# Patient Record
Sex: MALE | Race: WHITE | NOT HISPANIC OR LATINO
[De-identification: names, ages, dates, MRNs, and addresses within clinical notes are randomized per-mention and may not be internally consistent; named-entity substitution may affect disease eponyms.]

---

## 2017-05-08 ENCOUNTER — APPOINTMENT (OUTPATIENT)
Dept: SURGERY | Facility: CLINIC | Age: 47
End: 2017-05-08

## 2017-05-08 VITALS
SYSTOLIC BLOOD PRESSURE: 144 MMHG | BODY MASS INDEX: 37.04 KG/M2 | HEIGHT: 67 IN | HEART RATE: 75 BPM | DIASTOLIC BLOOD PRESSURE: 90 MMHG | WEIGHT: 236 LBS

## 2017-05-08 PROBLEM — Z00.00 ENCOUNTER FOR PREVENTIVE HEALTH EXAMINATION: Status: ACTIVE | Noted: 2017-05-08

## 2017-08-28 ENCOUNTER — OUTPATIENT (OUTPATIENT)
Dept: OUTPATIENT SERVICES | Facility: HOSPITAL | Age: 47
LOS: 1 days | End: 2017-08-28
Payer: COMMERCIAL

## 2017-08-28 VITALS
DIASTOLIC BLOOD PRESSURE: 80 MMHG | OXYGEN SATURATION: 99 % | HEIGHT: 67 IN | TEMPERATURE: 98 F | RESPIRATION RATE: 18 BRPM | HEART RATE: 76 BPM | WEIGHT: 240.08 LBS | SYSTOLIC BLOOD PRESSURE: 142 MMHG

## 2017-08-28 DIAGNOSIS — Z98.890 OTHER SPECIFIED POSTPROCEDURAL STATES: Chronic | ICD-10-CM

## 2017-08-28 DIAGNOSIS — K42.9 UMBILICAL HERNIA WITHOUT OBSTRUCTION OR GANGRENE: ICD-10-CM

## 2017-08-28 DIAGNOSIS — E55.9 VITAMIN D DEFICIENCY, UNSPECIFIED: ICD-10-CM

## 2017-08-28 DIAGNOSIS — Z01.818 ENCOUNTER FOR OTHER PREPROCEDURAL EXAMINATION: ICD-10-CM

## 2017-08-28 LAB
ANION GAP SERPL CALC-SCNC: 7 MMOL/L — SIGNIFICANT CHANGE UP (ref 5–17)
BUN SERPL-MCNC: 22 MG/DL — HIGH (ref 7–18)
CALCIUM SERPL-MCNC: 8.9 MG/DL — SIGNIFICANT CHANGE UP (ref 8.4–10.5)
CHLORIDE SERPL-SCNC: 104 MMOL/L — SIGNIFICANT CHANGE UP (ref 96–108)
CO2 SERPL-SCNC: 28 MMOL/L — SIGNIFICANT CHANGE UP (ref 22–31)
CREAT SERPL-MCNC: 1.06 MG/DL — SIGNIFICANT CHANGE UP (ref 0.5–1.3)
GLUCOSE SERPL-MCNC: 92 MG/DL — SIGNIFICANT CHANGE UP (ref 70–99)
HCT VFR BLD CALC: 45.6 % — SIGNIFICANT CHANGE UP (ref 39–50)
HGB BLD-MCNC: 14.6 G/DL — SIGNIFICANT CHANGE UP (ref 13–17)
MCHC RBC-ENTMCNC: 27.8 PG — SIGNIFICANT CHANGE UP (ref 27–34)
MCHC RBC-ENTMCNC: 32 GM/DL — SIGNIFICANT CHANGE UP (ref 32–36)
MCV RBC AUTO: 86.9 FL — SIGNIFICANT CHANGE UP (ref 80–100)
PLATELET # BLD AUTO: 314 K/UL — SIGNIFICANT CHANGE UP (ref 150–400)
POTASSIUM SERPL-MCNC: 3.8 MMOL/L — SIGNIFICANT CHANGE UP (ref 3.5–5.3)
POTASSIUM SERPL-SCNC: 3.8 MMOL/L — SIGNIFICANT CHANGE UP (ref 3.5–5.3)
RBC # BLD: 5.25 M/UL — SIGNIFICANT CHANGE UP (ref 4.2–5.8)
RBC # FLD: 14 % — SIGNIFICANT CHANGE UP (ref 10.3–14.5)
SODIUM SERPL-SCNC: 139 MMOL/L — SIGNIFICANT CHANGE UP (ref 135–145)
WBC # BLD: 6.6 K/UL — SIGNIFICANT CHANGE UP (ref 3.8–10.5)
WBC # FLD AUTO: 6.6 K/UL — SIGNIFICANT CHANGE UP (ref 3.8–10.5)

## 2017-08-28 PROCEDURE — G0463: CPT

## 2017-08-28 PROCEDURE — 80048 BASIC METABOLIC PNL TOTAL CA: CPT

## 2017-08-28 PROCEDURE — 93005 ELECTROCARDIOGRAM TRACING: CPT

## 2017-08-28 PROCEDURE — 85027 COMPLETE CBC AUTOMATED: CPT

## 2017-08-28 NOTE — H&P PST ADULT - NEGATIVE GASTROINTESTINAL SYMPTOMS
no change in bowel habits/no flatulence/no abdominal pain/no nausea/no constipation/no vomiting/no diarrhea

## 2017-08-28 NOTE — H&P PST ADULT - PMH
Hyperlipidemia    Vitamin D deficiency Hyperlipidemia    Obesity (BMI 30-39.9)    Vitamin D deficiency

## 2017-08-28 NOTE — H&P PST ADULT - NSANTHOSAYNRD_GEN_A_CORE
No. ELIZABETH screening performed.  STOP BANG Legend: 0-2 = LOW Risk; 3-4 = INTERMEDIATE Risk; 5-8 = HIGH Risk

## 2017-08-28 NOTE — H&P PST ADULT - FAMILY HISTORY
Mother  Still living? Yes, Estimated age: Age Unknown  Family history of pulmonary fibrosis, Age at diagnosis: Age Unknown

## 2017-08-28 NOTE — H&P PST ADULT - NEGATIVE CARDIOVASCULAR SYMPTOMS
no paroxysmal nocturnal dyspnea/no palpitations/no orthopnea/no claudication/no chest pain/no dyspnea on exertion/no peripheral edema

## 2017-09-08 ENCOUNTER — TRANSCRIPTION ENCOUNTER (OUTPATIENT)
Age: 47
End: 2017-09-08

## 2017-09-08 ENCOUNTER — OUTPATIENT (OUTPATIENT)
Dept: OUTPATIENT SERVICES | Facility: HOSPITAL | Age: 47
LOS: 1 days | Discharge: HOME | End: 2017-09-08
Payer: COMMERCIAL

## 2017-09-08 VITALS
RESPIRATION RATE: 18 BRPM | DIASTOLIC BLOOD PRESSURE: 85 MMHG | HEIGHT: 67 IN | TEMPERATURE: 98 F | SYSTOLIC BLOOD PRESSURE: 142 MMHG | WEIGHT: 240.08 LBS | HEART RATE: 76 BPM | OXYGEN SATURATION: 100 %

## 2017-09-08 VITALS
RESPIRATION RATE: 18 BRPM | HEART RATE: 57 BPM | OXYGEN SATURATION: 100 % | TEMPERATURE: 98 F | SYSTOLIC BLOOD PRESSURE: 143 MMHG | DIASTOLIC BLOOD PRESSURE: 61 MMHG

## 2017-09-08 DIAGNOSIS — K42.9 UMBILICAL HERNIA WITHOUT OBSTRUCTION OR GANGRENE: ICD-10-CM

## 2017-09-08 DIAGNOSIS — Z98.890 OTHER SPECIFIED POSTPROCEDURAL STATES: Chronic | ICD-10-CM

## 2017-09-08 PROCEDURE — 49585: CPT

## 2017-09-08 PROCEDURE — 49585: CPT | Mod: AS

## 2017-09-08 RX ORDER — OXYCODONE AND ACETAMINOPHEN 5; 325 MG/1; MG/1
1 TABLET ORAL EVERY 4 HOURS
Qty: 0 | Refills: 0 | Status: DISCONTINUED | OUTPATIENT
Start: 2017-09-08 | End: 2017-09-08

## 2017-09-08 RX ORDER — SODIUM CHLORIDE 9 MG/ML
3 INJECTION INTRAMUSCULAR; INTRAVENOUS; SUBCUTANEOUS EVERY 8 HOURS
Qty: 0 | Refills: 0 | Status: DISCONTINUED | OUTPATIENT
Start: 2017-09-08 | End: 2017-09-08

## 2017-09-08 RX ORDER — SODIUM CHLORIDE 9 MG/ML
1000 INJECTION, SOLUTION INTRAVENOUS
Qty: 0 | Refills: 0 | Status: DISCONTINUED | OUTPATIENT
Start: 2017-09-08 | End: 2017-09-16

## 2017-09-08 RX ADMIN — SODIUM CHLORIDE 3 MILLILITER(S): 9 INJECTION INTRAMUSCULAR; INTRAVENOUS; SUBCUTANEOUS at 13:31

## 2017-09-08 NOTE — ASU DISCHARGE PLAN (ADULT/PEDIATRIC). - MEDICATION SUMMARY - MEDICATIONS TO TAKE
I will START or STAY ON the medications listed below when I get home from the hospital:    vitamin d 50,000 IU  1 tab orally once a week  on saturday  --     -- Indication: For as previously perscribed    Percocet 5/325 325 mg-5 mg oral tablet  -- 1 tab(s) by mouth every 6 hours MDD:4  -- Caution federal law prohibits the transfer of this drug to any person other  than the person for whom it was prescribed.  May cause drowsiness.  Alcohol may intensify this effect.  Use care when operating dangerous machinery.  This prescription cannot be refilled.  This product contains acetaminophen.  Do not use  with any other product containing acetaminophen to prevent possible liver damage.  Using more of this medication than prescribed may cause serious breathing problems.    -- Indication: For pain    atorvastatin 40 mg oral tablet  -- 1 tab(s) by mouth once a day (at bedtime)  -- Indication: For as previously perscribed

## 2017-09-14 DIAGNOSIS — E55.9 VITAMIN D DEFICIENCY, UNSPECIFIED: ICD-10-CM

## 2017-09-14 DIAGNOSIS — E66.9 OBESITY, UNSPECIFIED: ICD-10-CM

## 2017-09-14 DIAGNOSIS — K42.9 UMBILICAL HERNIA WITHOUT OBSTRUCTION OR GANGRENE: ICD-10-CM

## 2017-09-14 DIAGNOSIS — E78.5 HYPERLIPIDEMIA, UNSPECIFIED: ICD-10-CM

## 2017-09-26 PROBLEM — Z86.39 HISTORY OF HIGH CHOLESTEROL: Status: RESOLVED | Noted: 2017-09-26 | Resolved: 2017-09-26

## 2017-09-26 PROBLEM — Z87.19 HISTORY OF UMBILICAL HERNIA: Status: RESOLVED | Noted: 2017-09-26 | Resolved: 2017-09-26

## 2017-09-26 PROBLEM — Z86.39 HISTORY OF VITAMIN D DEFICIENCY: Status: RESOLVED | Noted: 2017-09-26 | Resolved: 2017-09-26

## 2017-09-26 PROBLEM — Z78.9 SOCIAL ALCOHOL USE: Status: ACTIVE | Noted: 2017-05-08

## 2017-09-26 PROBLEM — F15.90 CAFFEINE USE: Status: ACTIVE | Noted: 2017-05-08

## 2017-09-26 PROBLEM — Z83.6 FAMILY HISTORY OF PULMONARY FIBROSIS: Status: ACTIVE | Noted: 2017-09-26

## 2017-09-26 PROBLEM — Z87.898 HISTORY OF MORBID OBESITY: Status: RESOLVED | Noted: 2017-09-26 | Resolved: 2017-09-26

## 2017-09-28 ENCOUNTER — APPOINTMENT (OUTPATIENT)
Dept: SURGERY | Facility: CLINIC | Age: 47
End: 2017-09-28

## 2017-09-28 VITALS
HEART RATE: 87 BPM | SYSTOLIC BLOOD PRESSURE: 120 MMHG | TEMPERATURE: 98.9 F | OXYGEN SATURATION: 95 % | BODY MASS INDEX: 38.57 KG/M2 | HEIGHT: 66 IN | WEIGHT: 240 LBS | DIASTOLIC BLOOD PRESSURE: 70 MMHG

## 2017-09-28 DIAGNOSIS — F15.90 OTHER STIMULANT USE, UNSPECIFIED, UNCOMPLICATED: ICD-10-CM

## 2017-09-28 DIAGNOSIS — Z87.898 PERSONAL HISTORY OF OTHER SPECIFIED CONDITIONS: ICD-10-CM

## 2017-09-28 DIAGNOSIS — Z86.39 PERSONAL HISTORY OF OTHER ENDOCRINE, NUTRITIONAL AND METABOLIC DISEASE: ICD-10-CM

## 2017-09-28 DIAGNOSIS — Z78.9 OTHER SPECIFIED HEALTH STATUS: ICD-10-CM

## 2017-09-28 DIAGNOSIS — Z87.19 PERSONAL HISTORY OF OTHER DISEASES OF THE DIGESTIVE SYSTEM: ICD-10-CM

## 2017-09-28 DIAGNOSIS — Z83.6 FAMILY HISTORY OF OTHER DISEASES OF THE RESPIRATORY SYSTEM: ICD-10-CM

## 2018-07-11 ENCOUNTER — INPATIENT (INPATIENT)
Facility: HOSPITAL | Age: 48
LOS: 1 days | Discharge: HOME | DRG: 247 | End: 2018-07-13
Attending: INTERNAL MEDICINE | Admitting: INTERNAL MEDICINE
Payer: COMMERCIAL

## 2018-07-11 ENCOUNTER — INPATIENT (INPATIENT)
Facility: HOSPITAL | Age: 48
LOS: 0 days | Discharge: HOME | DRG: 282 | End: 2018-07-11
Attending: INTERNAL MEDICINE | Admitting: INTERNAL MEDICINE
Payer: COMMERCIAL

## 2018-07-11 VITALS
DIASTOLIC BLOOD PRESSURE: 101 MMHG | HEIGHT: 67 IN | RESPIRATION RATE: 16 BRPM | SYSTOLIC BLOOD PRESSURE: 130 MMHG | WEIGHT: 225.09 LBS | OXYGEN SATURATION: 98 %

## 2018-07-11 VITALS
OXYGEN SATURATION: 98 % | RESPIRATION RATE: 18 BRPM | HEART RATE: 70 BPM | TEMPERATURE: 98 F | DIASTOLIC BLOOD PRESSURE: 87 MMHG | SYSTOLIC BLOOD PRESSURE: 132 MMHG

## 2018-07-11 VITALS
SYSTOLIC BLOOD PRESSURE: 141 MMHG | HEART RATE: 64 BPM | TEMPERATURE: 98 F | RESPIRATION RATE: 18 BRPM | DIASTOLIC BLOOD PRESSURE: 90 MMHG | WEIGHT: 225.09 LBS | HEIGHT: 67 IN | OXYGEN SATURATION: 98 %

## 2018-07-11 DIAGNOSIS — Z98.890 OTHER SPECIFIED POSTPROCEDURAL STATES: Chronic | ICD-10-CM

## 2018-07-11 DIAGNOSIS — E66.9 OBESITY, UNSPECIFIED: ICD-10-CM

## 2018-07-11 DIAGNOSIS — Z29.9 ENCOUNTER FOR PROPHYLACTIC MEASURES, UNSPECIFIED: ICD-10-CM

## 2018-07-11 DIAGNOSIS — I21.4 NON-ST ELEVATION (NSTEMI) MYOCARDIAL INFARCTION: ICD-10-CM

## 2018-07-11 DIAGNOSIS — E78.5 HYPERLIPIDEMIA, UNSPECIFIED: ICD-10-CM

## 2018-07-11 DIAGNOSIS — E55.9 VITAMIN D DEFICIENCY, UNSPECIFIED: ICD-10-CM

## 2018-07-11 LAB
ALBUMIN SERPL ELPH-MCNC: 4 G/DL — SIGNIFICANT CHANGE UP (ref 3.5–5)
ALP SERPL-CCNC: 87 U/L — SIGNIFICANT CHANGE UP (ref 40–120)
ALT FLD-CCNC: 45 U/L DA — SIGNIFICANT CHANGE UP (ref 10–60)
ANION GAP SERPL CALC-SCNC: 8 MMOL/L — SIGNIFICANT CHANGE UP (ref 5–17)
APTT BLD: 30.4 SEC — SIGNIFICANT CHANGE UP (ref 27.5–37.4)
AST SERPL-CCNC: 64 U/L — HIGH (ref 10–40)
BASOPHILS # BLD AUTO: 0.1 K/UL — SIGNIFICANT CHANGE UP (ref 0–0.2)
BASOPHILS NFR BLD AUTO: 1.1 % — SIGNIFICANT CHANGE UP (ref 0–2)
BILIRUB SERPL-MCNC: 1.1 MG/DL — SIGNIFICANT CHANGE UP (ref 0.2–1.2)
BUN SERPL-MCNC: 14 MG/DL — SIGNIFICANT CHANGE UP (ref 7–18)
CALCIUM SERPL-MCNC: 9.1 MG/DL — SIGNIFICANT CHANGE UP (ref 8.4–10.5)
CHLORIDE SERPL-SCNC: 104 MMOL/L — SIGNIFICANT CHANGE UP (ref 96–108)
CK MB BLD-MCNC: 7.5 % — HIGH (ref 0–3.5)
CK MB CFR SERPL CALC: 71.2 NG/ML — HIGH (ref 0–3.6)
CK SERPL-CCNC: 946 U/L — HIGH (ref 35–232)
CO2 SERPL-SCNC: 25 MMOL/L — SIGNIFICANT CHANGE UP (ref 22–31)
CREAT SERPL-MCNC: 0.97 MG/DL — SIGNIFICANT CHANGE UP (ref 0.5–1.3)
EOSINOPHIL # BLD AUTO: 0.2 K/UL — SIGNIFICANT CHANGE UP (ref 0–0.5)
EOSINOPHIL NFR BLD AUTO: 3.7 % — SIGNIFICANT CHANGE UP (ref 0–6)
GLUCOSE SERPL-MCNC: 107 MG/DL — HIGH (ref 70–99)
HCT VFR BLD CALC: 43.6 % — SIGNIFICANT CHANGE UP (ref 39–50)
HGB BLD-MCNC: 14 G/DL — SIGNIFICANT CHANGE UP (ref 13–17)
INR BLD: 1.02 RATIO — SIGNIFICANT CHANGE UP (ref 0.88–1.16)
LYMPHOCYTES # BLD AUTO: 1.3 K/UL — SIGNIFICANT CHANGE UP (ref 1–3.3)
LYMPHOCYTES # BLD AUTO: 24.8 % — SIGNIFICANT CHANGE UP (ref 13–44)
MCHC RBC-ENTMCNC: 28.2 PG — SIGNIFICANT CHANGE UP (ref 27–34)
MCHC RBC-ENTMCNC: 32.2 GM/DL — SIGNIFICANT CHANGE UP (ref 32–36)
MCV RBC AUTO: 87.5 FL — SIGNIFICANT CHANGE UP (ref 80–100)
MONOCYTES # BLD AUTO: 0.4 K/UL — SIGNIFICANT CHANGE UP (ref 0–0.9)
MONOCYTES NFR BLD AUTO: 8.5 % — SIGNIFICANT CHANGE UP (ref 2–14)
NEUTROPHILS # BLD AUTO: 3.2 K/UL — SIGNIFICANT CHANGE UP (ref 1.8–7.4)
NEUTROPHILS NFR BLD AUTO: 61.9 % — SIGNIFICANT CHANGE UP (ref 43–77)
PLATELET # BLD AUTO: 294 K/UL — SIGNIFICANT CHANGE UP (ref 150–400)
POTASSIUM SERPL-MCNC: 4 MMOL/L — SIGNIFICANT CHANGE UP (ref 3.5–5.3)
POTASSIUM SERPL-SCNC: 4 MMOL/L — SIGNIFICANT CHANGE UP (ref 3.5–5.3)
PROT SERPL-MCNC: 7.9 G/DL — SIGNIFICANT CHANGE UP (ref 6–8.3)
PROTHROM AB SERPL-ACNC: 11.1 SEC — SIGNIFICANT CHANGE UP (ref 9.8–12.7)
RBC # BLD: 4.99 M/UL — SIGNIFICANT CHANGE UP (ref 4.2–5.8)
RBC # FLD: 13.3 % — SIGNIFICANT CHANGE UP (ref 10.3–14.5)
SODIUM SERPL-SCNC: 137 MMOL/L — SIGNIFICANT CHANGE UP (ref 135–145)
TROPONIN I SERPL-MCNC: 2.18 NG/ML — HIGH (ref 0–0.04)
TROPONIN I SERPL-MCNC: 38 NG/ML — HIGH (ref 0–0.04)
WBC # BLD: 5.1 K/UL — SIGNIFICANT CHANGE UP (ref 3.8–10.5)
WBC # FLD AUTO: 5.1 K/UL — SIGNIFICANT CHANGE UP (ref 3.8–10.5)

## 2018-07-11 PROCEDURE — 93306 TTE W/DOPPLER COMPLETE: CPT

## 2018-07-11 PROCEDURE — 71046 X-RAY EXAM CHEST 2 VIEWS: CPT

## 2018-07-11 PROCEDURE — 85027 COMPLETE CBC AUTOMATED: CPT

## 2018-07-11 PROCEDURE — 99285 EMERGENCY DEPT VISIT HI MDM: CPT | Mod: 25

## 2018-07-11 PROCEDURE — 82553 CREATINE MB FRACTION: CPT

## 2018-07-11 PROCEDURE — 82550 ASSAY OF CK (CPK): CPT

## 2018-07-11 PROCEDURE — 71046 X-RAY EXAM CHEST 2 VIEWS: CPT | Mod: 26

## 2018-07-11 PROCEDURE — 99285 EMERGENCY DEPT VISIT HI MDM: CPT

## 2018-07-11 PROCEDURE — 85730 THROMBOPLASTIN TIME PARTIAL: CPT

## 2018-07-11 PROCEDURE — 84484 ASSAY OF TROPONIN QUANT: CPT

## 2018-07-11 PROCEDURE — 93010 ELECTROCARDIOGRAM REPORT: CPT

## 2018-07-11 PROCEDURE — 85610 PROTHROMBIN TIME: CPT

## 2018-07-11 PROCEDURE — 80053 COMPREHEN METABOLIC PANEL: CPT

## 2018-07-11 RX ORDER — ATORVASTATIN CALCIUM 80 MG/1
40 TABLET, FILM COATED ORAL AT BEDTIME
Qty: 0 | Refills: 0 | Status: DISCONTINUED | OUTPATIENT
Start: 2018-07-11 | End: 2018-07-11

## 2018-07-11 RX ORDER — ASPIRIN/CALCIUM CARB/MAGNESIUM 324 MG
1 TABLET ORAL
Qty: 0 | Refills: 0 | COMMUNITY
Start: 2018-07-11

## 2018-07-11 RX ORDER — HEPARIN SODIUM 5000 [USP'U]/ML
INJECTION INTRAVENOUS; SUBCUTANEOUS
Qty: 25000 | Refills: 0 | Status: DISCONTINUED | OUTPATIENT
Start: 2018-07-11 | End: 2018-07-11

## 2018-07-11 RX ORDER — CLOPIDOGREL BISULFATE 75 MG/1
75 TABLET, FILM COATED ORAL DAILY
Qty: 0 | Refills: 0 | Status: DISCONTINUED | OUTPATIENT
Start: 2018-07-11 | End: 2018-07-13

## 2018-07-11 RX ORDER — MORPHINE SULFATE 50 MG/1
2 CAPSULE, EXTENDED RELEASE ORAL ONCE
Qty: 0 | Refills: 0 | Status: DISCONTINUED | OUTPATIENT
Start: 2018-07-11 | End: 2018-07-11

## 2018-07-11 RX ORDER — HEPARIN SODIUM 5000 [USP'U]/ML
0 INJECTION INTRAVENOUS; SUBCUTANEOUS
Qty: 0 | Refills: 0 | COMMUNITY
Start: 2018-07-11

## 2018-07-11 RX ORDER — METOPROLOL TARTRATE 50 MG
1 TABLET ORAL
Qty: 0 | Refills: 0 | COMMUNITY
Start: 2018-07-11

## 2018-07-11 RX ORDER — HEPARIN SODIUM 5000 [USP'U]/ML
5000 INJECTION INTRAVENOUS; SUBCUTANEOUS EVERY 12 HOURS
Qty: 0 | Refills: 0 | Status: DISCONTINUED | OUTPATIENT
Start: 2018-07-11 | End: 2018-07-11

## 2018-07-11 RX ORDER — FAMOTIDINE 10 MG/ML
20 INJECTION INTRAVENOUS ONCE
Qty: 0 | Refills: 0 | Status: COMPLETED | OUTPATIENT
Start: 2018-07-11 | End: 2018-07-11

## 2018-07-11 RX ORDER — PANTOPRAZOLE SODIUM 20 MG/1
40 TABLET, DELAYED RELEASE ORAL
Qty: 0 | Refills: 0 | Status: DISCONTINUED | OUTPATIENT
Start: 2018-07-11 | End: 2018-07-11

## 2018-07-11 RX ORDER — HEPARIN SODIUM 5000 [USP'U]/ML
8500 INJECTION INTRAVENOUS; SUBCUTANEOUS EVERY 6 HOURS
Qty: 0 | Refills: 0 | Status: DISCONTINUED | OUTPATIENT
Start: 2018-07-11 | End: 2018-07-11

## 2018-07-11 RX ORDER — SODIUM CHLORIDE 9 MG/ML
3 INJECTION INTRAMUSCULAR; INTRAVENOUS; SUBCUTANEOUS ONCE
Qty: 0 | Refills: 0 | Status: COMPLETED | OUTPATIENT
Start: 2018-07-11 | End: 2018-07-11

## 2018-07-11 RX ORDER — HEPARIN SODIUM 5000 [USP'U]/ML
8500 INJECTION INTRAVENOUS; SUBCUTANEOUS ONCE
Qty: 0 | Refills: 0 | Status: DISCONTINUED | OUTPATIENT
Start: 2018-07-11 | End: 2018-07-11

## 2018-07-11 RX ORDER — ASPIRIN/CALCIUM CARB/MAGNESIUM 324 MG
81 TABLET ORAL DAILY
Qty: 0 | Refills: 0 | Status: DISCONTINUED | OUTPATIENT
Start: 2018-07-11 | End: 2018-07-11

## 2018-07-11 RX ORDER — METOPROLOL TARTRATE 50 MG
25 TABLET ORAL
Qty: 0 | Refills: 0 | Status: DISCONTINUED | OUTPATIENT
Start: 2018-07-11 | End: 2018-07-13

## 2018-07-11 RX ORDER — ASPIRIN/CALCIUM CARB/MAGNESIUM 324 MG
81 TABLET ORAL DAILY
Qty: 0 | Refills: 0 | Status: DISCONTINUED | OUTPATIENT
Start: 2018-07-11 | End: 2018-07-13

## 2018-07-11 RX ORDER — ATORVASTATIN CALCIUM 80 MG/1
40 TABLET, FILM COATED ORAL AT BEDTIME
Qty: 0 | Refills: 0 | Status: DISCONTINUED | OUTPATIENT
Start: 2018-07-11 | End: 2018-07-13

## 2018-07-11 RX ORDER — ONDANSETRON 8 MG/1
4 TABLET, FILM COATED ORAL ONCE
Qty: 0 | Refills: 0 | Status: COMPLETED | OUTPATIENT
Start: 2018-07-11 | End: 2018-07-11

## 2018-07-11 RX ORDER — HEPARIN SODIUM 5000 [USP'U]/ML
4000 INJECTION INTRAVENOUS; SUBCUTANEOUS EVERY 6 HOURS
Qty: 0 | Refills: 0 | Status: DISCONTINUED | OUTPATIENT
Start: 2018-07-11 | End: 2018-07-11

## 2018-07-11 RX ORDER — ASPIRIN/CALCIUM CARB/MAGNESIUM 324 MG
162 TABLET ORAL ONCE
Qty: 0 | Refills: 0 | Status: COMPLETED | OUTPATIENT
Start: 2018-07-11 | End: 2018-07-11

## 2018-07-11 RX ORDER — METOPROLOL TARTRATE 50 MG
25 TABLET ORAL
Qty: 0 | Refills: 0 | Status: DISCONTINUED | OUTPATIENT
Start: 2018-07-11 | End: 2018-07-11

## 2018-07-11 RX ADMIN — Medication 162 MILLIGRAM(S): at 09:10

## 2018-07-11 RX ADMIN — MORPHINE SULFATE 2 MILLIGRAM(S): 50 CAPSULE, EXTENDED RELEASE ORAL at 22:28

## 2018-07-11 RX ADMIN — FAMOTIDINE 20 MILLIGRAM(S): 10 INJECTION INTRAVENOUS at 09:10

## 2018-07-11 RX ADMIN — MORPHINE SULFATE 2 MILLIGRAM(S): 50 CAPSULE, EXTENDED RELEASE ORAL at 22:15

## 2018-07-11 RX ADMIN — MORPHINE SULFATE 2 MILLIGRAM(S): 50 CAPSULE, EXTENDED RELEASE ORAL at 21:25

## 2018-07-11 RX ADMIN — Medication 30 MILLILITER(S): at 09:10

## 2018-07-11 RX ADMIN — MORPHINE SULFATE 2 MILLIGRAM(S): 50 CAPSULE, EXTENDED RELEASE ORAL at 21:50

## 2018-07-11 RX ADMIN — MORPHINE SULFATE 2 MILLIGRAM(S): 50 CAPSULE, EXTENDED RELEASE ORAL at 21:13

## 2018-07-11 RX ADMIN — ATORVASTATIN CALCIUM 40 MILLIGRAM(S): 80 TABLET, FILM COATED ORAL at 22:04

## 2018-07-11 RX ADMIN — SODIUM CHLORIDE 3 MILLILITER(S): 9 INJECTION INTRAMUSCULAR; INTRAVENOUS; SUBCUTANEOUS at 07:38

## 2018-07-11 RX ADMIN — HEPARIN SODIUM 1800 UNIT(S)/HR: 5000 INJECTION INTRAVENOUS; SUBCUTANEOUS at 17:00

## 2018-07-11 RX ADMIN — ONDANSETRON 4 MILLIGRAM(S): 8 TABLET, FILM COATED ORAL at 21:50

## 2018-07-11 RX ADMIN — MORPHINE SULFATE 2 MILLIGRAM(S): 50 CAPSULE, EXTENDED RELEASE ORAL at 22:02

## 2018-07-11 NOTE — DISCHARGE NOTE ADULT - HOSPITAL COURSE
48 Obese M w/ PMH HLD p/w new onset constant chest pain x AM. CP began at 4 AM and lasted till 6:30AM, woke him up from sleep and worsened w/ lying on his side, described as a pressure, and associated w/ b/l neck and back pain which occurred at onset of CP. Pt believed pain 2/2 to GERD and had associated belching. Otherwise denies chills, fever, weakness, SOB, abdominal pain, N/V/D, or any other complaints. Pt denied any significant Hx of tobacco use, alcohol use, or illicit drug use, and denies any FHx of SCD or cardiopulmonary disease. Admitted patient for further evaluation - Currently asymptomatic, vital signs stable. W/u CXR WNLs; no mediastinum widening, EKG NSR w/out ST changes, Troponin elevated 2.180, and echocardiogram showing wall motion abnormalities; ultimately HEART score 6, moderate risk for adverse cardiac event. Pt treated w/ ASA, Statin, and beta blocker. Repeat Trop increased to 38 - decision was made to start Heparin and transfer to [place] Under Dr Rodriguez. Patient seen and examined at bedside with no acute complaints. The medical plan and results were discussed with the patient throughout the hospital course. Patient is medically clear for transfer and is advised to follow up with his primary care physician within a week for continued medical care. Please see above and the medical records for additional information.

## 2018-07-11 NOTE — PROGRESS NOTE ADULT - SUBJECTIVE AND OBJECTIVE BOX
Called to bedside by MEÑO Cornejo RN, to assess swelling and firmness around right groin catherterization site. Patient reports site feels hard, pain to touch. Morphine 2mg IVP x1 dose given, manual pressure applied to right groin site for 10 minutes. Site soft to touch after removal of pressure, /83, HR 69, RR 18. Patient A&O x3, acknowledges post-catherterization instructions and teaching.

## 2018-07-11 NOTE — PROGRESS NOTE ADULT - SUBJECTIVE AND OBJECTIVE BOX
History of Present Illness:  Reason for Admission: chest pain	  History of Present Illness: 	  48 Obese M w/ PMH HLD p/w new onset constant chest pain x AM. CP began at 4 AM and lasted till 6:30AM, woke him up from sleep and worsened w/ lying on his side, described as a pressure, and associated w/ b/l neck and back pain which occurred at onset of CP. Pt believed pain 2/2 to GERD and had associated belching. Otherwise denies chills, fever, weakness, SOB, abdominal pain, N/V/D, or any other complaints. Pt denied any significant Hx of tobacco use, alcohol use, or illicit drug use, and denies any FHx of SCD or cardiopulmonary disease.     Review of Systems:  Review of Systems: As per HPI	      Allergies and Intolerances:        Allergies:  	No Known Allergies:     Home Medications:   * Patient Currently Takes Medications as of 08-Sep-2017 14:55 documented in Structured Notes  · 	Percocet 5/325 325 mg-5 mg oral tablet: 1 tab(s) orally every 6 hours MDD:4  · 	atorvastatin 40 mg oral tablet: 1 tab(s) orally once a day (at bedtime)  · 	vitamin d 50,000 IU  1 tab orally once a week  on saturday:       .    Patient History:    Past Medical History:  Hyperlipidemia    Obesity (BMI 30-39.9)    Vitamin D deficiency.     Past Surgical History:  H/O rotator cuff surgery  right  2007.     Family History:  No pertinent family history in first degree relatives.     Social History:  Social History (marital status, living situation, occupation, tobacco use, alcohol and drug use, and sexual history): Works as , high stressful, 2nd child born 7 weeks ago,  w/ wife, drinks 2 beers a week - otherwise denied tobacco or illicit drug Hx	     Tobacco Screening:  · Core Measure Site	Yes	  · Has the patient used tobacco in the past 30 days?	No	    Risk Assessment:    Present on Admission:  Deep Venous Thrombosis	no	  Pulmonary Embolus	no	     Heart Failure:  Does this patient have a history of or has been diagnosed with heart failure? no.    HIV Screen (per [facility] Department of Health, HIV screening must be offered to every individual between ages 13 and 64)	Offered and patient declined	       Physical Exam:  Physical Exam: T(C): 36.7 (11 Jul 2018 10:43), Max: 36.8 (11 Jul 2018 07:15)  T(F): 98 (11 Jul 2018 10:43), Max: 98.2 (11 Jul 2018 07:15)  HR: 72 (11 Jul 2018 10:43) (64 - 72)  BP: 145/87 (11 Jul 2018 10:43) (141/86 - 145/87)  RR: 18 (11 Jul 2018 10:43) (18 - 18) SpO2: 100% (11 Jul 2018 10:43) (97% - 100%)	     Physical Exam:  · Constitutional	detailed exam	  · Constitutional Details	well-developed; well-groomed	  · Eyes	detailed exam	  · Eyes Details	EOMI	  · ENMT	detailed exam	  · ENMT Details	mouth	  · Mouth	moist	  · Neck	detailed exam	  · Back	detailed exam	  · Back Details	normal shape	  · Respiratory	detailed exam	  · Respiratory Details	no rales; no rhonchi; no wheezes	  · Cardiovascular	detailed exam	  · Cardiovascular Details	regular rate and rhythm  no murmur	  · Gastrointestinal	detailed exam	  · GI Normal	soft; nontender; no distention	  · Extremities	detailed exam	  · Extremities Details	no clubbing; no cyanosis; no pedal edema	  · Vascular	detailed exam	  · Radial Pulse	right normal; left normal	  · Neurological	detailed exam	  · Neurological Details	alert and oriented x 3; responds to verbal commands	  · Skin	detailed exam	  · Skin Details	warm and dry	  · Lymph Nodes	detailed exam	  · Lymphatic Details	inguinal L; inguinal R	  · Inguinal L	normal	  · Inguinal R	normal	  · Musculoskeletal	detailed exam	  · Musculoskeletal Details	ROM intact	  · Psychiatric	detailed exam	  · Psychiatric Details	normal affect	       Labs and Results:  Labs, Radiology, Cardiology, and Other Results: CBC Full  -  ( 11 Jul 2018 07:50 )  WBC Count : 5.1 K/uL  Hemoglobin : 14.0 g/dL  Hematocrit : 43.6 %  Platelet Count - Automated : 294 K/uL  Mean Cell Volume : 87.5 fl  Mean Cell Hemoglobin : 28.2 pg  Mean Cell Hemoglobin Concentration : 32.2 gm/dL  Auto Neutrophil # : 3.2 K/uL  Auto Lymphocyte # : 1.3 K/uL  Auto Monocyte # : 0.4 K/uL  Auto Eosinophil # : 0.2 K/uL  Auto Basophil # : 0.1 K/uL  Auto Neutrophil % : 61.9 %  Auto Lymphocyte % : 24.8 %  Auto Monocyte % : 8.5 %  Auto Eosinophil % : 3.7 %  Auto Basophil % : 1.1 %   07-11   137  |  104  |  14  ----------------------------<  107<H>  4.0   |  25  |  0.97   Ca    9.1      11 Jul 2018 07:50   TPro  7.9  /  Alb  4.0  /  TBili  1.1  /  DBili  x   /  AST  64<H>  /  ALT  45  /  AlkPhos  87  07-11   PT/INR - ( 11 Jul 2018 07:50 )   PT: 11.1 sec;   INR: 1.02 ratio        PTT - ( 11 Jul 2018 07:50 )  PTT:30.4 sec   CARDIAC MARKERS ( 11 Jul 2018 07:50 )  2.180 ng/mL / x     / x     / x     / x       RADIOLOGY & ADDITIONAL STUDIES (The following images were personally reviewed):   EKG NSR w/ no ST changes   CXR w/ no consolidation or acute cardiopulmonary disease apparent	    Assessment and Plan:    Assessment:  · Assessment		  48 Obese M w/ PMH HLD p/w new onset constant chest pain x AM. CP began at 4 AM and lasted till 6:30AM, woke him up from sleep and worsened w/ lying on his side, described as a pressure, and associated w/ b/l neck and back pain which occurred at onset of CP. Pt believed pain 2/2 to GERD and had associated belching. Otherwise denies chills, fever, weakness, SOB, abdominal pain, N/V/D, or any other complaints. Pt denied any significant Hx of tobacco use, alcohol use, or illicit drug use, and denies any FHx of SCD or cardiopulmonary disease.     Problem/Plan - 1:  ·  Problem: NSTEMI (non-ST elevated myocardial infarction).  Plan: P/w pressure like chest pain at rest at 4AM w/ pain a b/l neck/shoulders/back x 2.5 hours  - Currently asymptomatic, vital signs stable  - CXR WNLs; no mediastinum widening, EKG NSR w/out ST changes  - Troponin elevated 2.180  - HEART score 6, moderate risk  - C/w ASA, Statin, and beta blocker  -D/W pt in detail option of invasive vs non-invasive approach, await CE q8 and echocardiogram. If troponin increase or recurrent CP or wall motion abnormality then would proceed with cardiac cath rather than stress test.      Problem/Plan - 2:  ·  Problem: Obesity (BMI 30-39.9).  Plan: Nutrition consult.      Problem/Plan - 3:  ·  Problem: Vitamin D deficiency.  Plan: F/u Vitamin D levels.      Problem/Plan - 4:  ·  Problem: Prophylactic measure.  Plan: IMPROVE VTE Individual Risk Assessment    RISK                                                          Points  [] Previous VTE                                           3  [] Thrombophilia                                        2  [] Lower limb paralysis                              2   [] Current Cancer                                       2   [] Immobilization > 24 hrs                        1  [] ICU/CCU stay > 24 hours                       1  [] Age > 60                                                   1    IMPROVE VTE Score: 0, DVT PPx w/ Lovenox.

## 2018-07-11 NOTE — ED PROVIDER NOTE - CONDUCTED A DETAILED DISCUSSION WITH PATIENT AND/OR GUARDIAN REGARDING, MDM
radiology results/lab results/need for outpatient follow-up radiology results/lab results/need to admit

## 2018-07-11 NOTE — ED PROVIDER NOTE - OBJECTIVE STATEMENT
47 y/o M pt with PMHx of HLD, Obesity, and Vitamin D Deficiency and PSHx of Rotator Cuff Surgery presents to ED c/o chest pressure, burping sensation, neck pain, and back pain since 04:00am today (x4 hours PTA n ED). Pt additionally reports associated nausea. Per pt, sx's lasted for approximately x3 hours, and have since resolved. Pt admits to eating spicy food yesterday evening, and has been under a lot of stress with a  at home. Pt denies vomiting, diaphoresis, SOB, palpitations, b/l leg swelling, or any other complaints. Pt also denies FHx of CAD, drug use/abuse, or having visited/seen a cardiologist in the past. NKDA.

## 2018-07-11 NOTE — DISCHARGE NOTE ADULT - MEDICATION SUMMARY - MEDICATIONS TO STOP TAKING
I will STOP taking the medications listed below when I get home from the hospital:    Percocet 5/325 325 mg-5 mg oral tablet  -- 1 tab(s) by mouth every 6 hours MDD:4  -- Caution federal law prohibits the transfer of this drug to any person other  than the person for whom it was prescribed.  May cause drowsiness.  Alcohol may intensify this effect.  Use care when operating dangerous machinery.  This prescription cannot be refilled.  This product contains acetaminophen.  Do not use  with any other product containing acetaminophen to prevent possible liver damage.  Using more of this medication than prescribed may cause serious breathing problems.

## 2018-07-11 NOTE — H&P ADULT - NSHPPHYSICALEXAM_GEN_ALL_CORE
T(C): 36.7 (11 Jul 2018 10:43), Max: 36.8 (11 Jul 2018 07:15)  T(F): 98 (11 Jul 2018 10:43), Max: 98.2 (11 Jul 2018 07:15)  HR: 72 (11 Jul 2018 10:43) (64 - 72)  BP: 145/87 (11 Jul 2018 10:43) (141/86 - 145/87)  RR: 18 (11 Jul 2018 10:43) (18 - 18)  SpO2: 100% (11 Jul 2018 10:43) (97% - 100%)

## 2018-07-11 NOTE — H&P ADULT - PROBLEM SELECTOR PLAN 4
IMPROVE VTE Individual Risk Assessment    RISK                                                          Points  [] Previous VTE                                           3  [] Thrombophilia                                        2  [] Lower limb paralysis                              2   [] Current Cancer                                       2   [] Immobilization > 24 hrs                        1  [] ICU/CCU stay > 24 hours                       1  [] Age > 60                                                   1    IMPROVE VTE Score: 0, DVT PPx w/ Lovenox

## 2018-07-11 NOTE — DISCHARGE NOTE ADULT - CARE PLAN
Principal Discharge DX:	NSTEMI (non-ST elevated myocardial infarction)  Goal:	Transfer for cardiac catheterization  Assessment and plan of treatment:	You presented with chest pain and blood tests you may be undergoing damage to your heart; you were treated with an Aspirin, Statin, and a beta blocker along with a Heparin drip - the cardiologist has recommended that you be transferred for urgent cardiac catheterization. Follow up with your primary care physician within a week from discharge

## 2018-07-11 NOTE — DISCHARGE NOTE ADULT - PLAN OF CARE
Transfer for cardiac catheterization You presented with chest pain and blood tests you may be undergoing damage to your heart; you were treated with an Aspirin, Statin, and a beta blocker along with a Heparin drip - the cardiologist has recommended that you be transferred for urgent cardiac catheterization. Follow up with your primary care physician within a week from discharge

## 2018-07-11 NOTE — H&P ADULT - ASSESSMENT
48 Obese M w/ PMH HLD p/w new onset constant chest pain x AM. CP began at 4 AM and lasted till 6:30AM, woke him up from sleep and worsened w/ lying on his side, described as a pressure, and associated w/ b/l neck and back pain which occurred at onset of CP. Pt believed pain 2/2 to GERD and had associated belching. Otherwise denies chills, fever, weakness, SOB, abdominal pain, N/V/D, or any other complaints. Pt denied any significant Hx of tobacco use, alcohol use, or illicit drug use, and denies any FHx of SCD or cardiopulmonary disease.

## 2018-07-11 NOTE — H&P CARDIOLOGY - HISTORY OF PRESENT ILLNESS
49 y/o  Obese Male (BMI 35) with PMH HLD p/w new onset constant chest pain x AM. CP began at 4 AM and lasted till 6:30AM, woke him up from sleep and worsened w/ lying on his side, described as a pressure, and associated w/ b/l neck and back pain which occurred at onset of CP. Pt believed pain 2/2 to GERD and had associated belching. Otherwise denies chills, fever, weakness, SOB, abdominal pain, N/V/D, or any other complaints. Pt denied any significant Hx of tobacco use, alcohol use, or illicit drug use, and denies any FHx of SCD or cardiopulmonary disease.  Pt with Trop 2.18 -> 38 loaded with ASA 325mg and Plavix 300mg started on Heparin gtt for ACS TTE revealed LVEF 50-55% with mild MR and mild LV systolic function.  Pt transferred for cardiac cath with no chest pain brought to Lab.    PCP - Dr. Minaya

## 2018-07-11 NOTE — ED ADULT NURSE NOTE - OBJECTIVE STATEMENT
as per pt , I had chest pain x this am and it concerned me , pt EKG completed , normal - placed on monitor , medicated as per MD order see chart ,tolerated meds well , no ill effects noted heplock placed to LT AC20G , pt stated relief after meds completed , labs repeat - abd result noted , pt for transfer , left on stretcher with EMS speaking full sentences in stable condition denies pain

## 2018-07-11 NOTE — DISCHARGE NOTE ADULT - PATIENT PORTAL LINK FT
You can access the [facility] Patient Portal, offered by [facility], by registering with the following website: [email or web address]

## 2018-07-11 NOTE — H&P ADULT - NSHPLABSRESULTS_GEN_ALL_CORE
CBC Full  -  ( 11 Jul 2018 07:50 )  WBC Count : 5.1 K/uL  Hemoglobin : 14.0 g/dL  Hematocrit : 43.6 %  Platelet Count - Automated : 294 K/uL  Mean Cell Volume : 87.5 fl  Mean Cell Hemoglobin : 28.2 pg  Mean Cell Hemoglobin Concentration : 32.2 gm/dL  Auto Neutrophil # : 3.2 K/uL  Auto Lymphocyte # : 1.3 K/uL  Auto Monocyte # : 0.4 K/uL  Auto Eosinophil # : 0.2 K/uL  Auto Basophil # : 0.1 K/uL  Auto Neutrophil % : 61.9 %  Auto Lymphocyte % : 24.8 %  Auto Monocyte % : 8.5 %  Auto Eosinophil % : 3.7 %  Auto Basophil % : 1.1 %    07-11    137  |  104  |  14  ----------------------------<  107<H>  4.0   |  25  |  0.97    Ca    9.1      11 Jul 2018 07:50    TPro  7.9  /  Alb  4.0  /  TBili  1.1  /  DBili  x   /  AST  64<H>  /  ALT  45  /  AlkPhos  87  07-11    PT/INR - ( 11 Jul 2018 07:50 )   PT: 11.1 sec;   INR: 1.02 ratio         PTT - ( 11 Jul 2018 07:50 )  PTT:30.4 sec    CARDIAC MARKERS ( 11 Jul 2018 07:50 )  2.180 ng/mL / x     / x     / x     / x        RADIOLOGY & ADDITIONAL STUDIES (The following images were personally reviewed):    EKG NSR w/ no ST changes    CXR w/ no consolidation or acute cardiopulmonary disease apparent

## 2018-07-11 NOTE — PROGRESS NOTE ADULT - SUBJECTIVE AND OBJECTIVE BOX
Removal of Right Femoral Sheath    Pulses in the right lower extremity are palpable. The patient was placed in the supine position. The insertion site was identified and the sutures were removed per protocol.  The 7 Swedish femoral sheath was then removed. Direct pressure was applied for 20 minutes.     Monitoring of the right groin and both lower extremities including neuro-vascular checks and vital signs every 15 minutes x 4, then every 30 minutes x 2, then every 1 hour was ordered.    Complications: Site firm to palpation after removal of manual pressure, additional manual pressure applied to site for 5 minutes, soft to touch upon removal of manual pressure.    Comments:

## 2018-07-11 NOTE — H&P ADULT - PROBLEM SELECTOR PLAN 1
P/w pressure like chest pain at rest at 4AM w/ pain a b/l neck/shoulders/back x 2.5 hours  - Currently asymptomatic, vital signs acceptable  - CXR WNLs; no mediastinum widening, EKG NSR w/out ST changes  - Troponin elevated 2.180  - HEART score 6, moderate risk  - C/w ASA, Statin, and beta blocker  Cardiology Dr Ellington P/w pressure like chest pain at rest at 4AM w/ pain a b/l neck/shoulders/back x 2.5 hours  - Currently asymptomatic, vital signs acceptable  - CXR WNLs; no mediastinum widening, EKG NSR w/out ST changes  - Troponin elevated 2.180  - HEART score 6, moderate risk  - C/w ASA, Statin, and beta blocker  Cardiology Dr Craig  ***F/u TTE, serial Jasmin, A1c, and Lipid profile P/w pressure like chest pain at rest at 4AM w/ pain a b/l neck/shoulders/back x 2.5 hours  - Currently asymptomatic, vital signs stable  - CXR WNLs; no mediastinum widening, EKG NSR w/out ST changes  - Troponin elevated 2.180  - HEART score 6, moderate risk  - C/w ASA, Statin, and beta blocker  -D/W pt in detail option of invasive vs non-invasive approach, await CE q8 and echocardiogram. If troponin increase or recurrent CP or wall motion abnormality then would proceed with cardiac cath rather than stress test.

## 2018-07-11 NOTE — DISCHARGE NOTE ADULT - MEDICATION SUMMARY - MEDICATIONS TO TAKE
I will START or STAY ON the medications listed below when I get home from the hospital:    vitamin d 50,000 IU  1 tab orally once a week  on saturday  --     -- Indication: For Vitamin D deficiency    aspirin 81 mg oral tablet, chewable  -- 1 tab(s) by mouth once a day  -- Indication: For NSTEMI (non-ST elevated myocardial infarction)    heparin 100 units/mL-D5% intravenous solution  --  intravenous   -- Indication: For NSTEMI (non-ST elevated myocardial infarction)    atorvastatin 40 mg oral tablet  -- 1 tab(s) by mouth once a day (at bedtime)  -- Indication: For NSTEMI (non-ST elevated myocardial infarction)    metoprolol tartrate 25 mg oral tablet  -- 1 tab(s) by mouth 2 times a day  -- Indication: For NSTEMI (non-ST elevated myocardial infarction)

## 2018-07-11 NOTE — ED PROVIDER NOTE - MEDICAL DECISION MAKING DETAILS
49 y/o M pt presents with CP. Will r/o ACS versus reflux. Very low likelihood for dissection with no Hx to suggest it and also exam findings. No sign of infectious cause. Will obtain labs, EKG, and if pt is low-risk, will have pt f/u with outpatient cardiology, and if high-risk, will admit.

## 2018-07-12 ENCOUNTER — TRANSCRIPTION ENCOUNTER (OUTPATIENT)
Age: 48
End: 2018-07-12

## 2018-07-12 LAB
ANION GAP SERPL CALC-SCNC: 13 MMOL/L — SIGNIFICANT CHANGE UP (ref 5–17)
BASOPHILS # BLD AUTO: 0 K/UL — SIGNIFICANT CHANGE UP (ref 0–0.2)
BASOPHILS NFR BLD AUTO: 0 % — SIGNIFICANT CHANGE UP (ref 0–2)
BUN SERPL-MCNC: 12 MG/DL — SIGNIFICANT CHANGE UP (ref 7–23)
CALCIUM SERPL-MCNC: 8.8 MG/DL — SIGNIFICANT CHANGE UP (ref 8.4–10.5)
CHLORIDE SERPL-SCNC: 101 MMOL/L — SIGNIFICANT CHANGE UP (ref 96–108)
CHOLEST SERPL-MCNC: 196 MG/DL — SIGNIFICANT CHANGE UP (ref 10–199)
CO2 SERPL-SCNC: 24 MMOL/L — SIGNIFICANT CHANGE UP (ref 22–31)
CREAT SERPL-MCNC: 0.8 MG/DL — SIGNIFICANT CHANGE UP (ref 0.5–1.3)
EOSINOPHIL # BLD AUTO: 0.1 K/UL — SIGNIFICANT CHANGE UP (ref 0–0.5)
EOSINOPHIL NFR BLD AUTO: 1 % — SIGNIFICANT CHANGE UP (ref 0–6)
GLUCOSE SERPL-MCNC: 135 MG/DL — HIGH (ref 70–99)
HCT VFR BLD CALC: 40 % — SIGNIFICANT CHANGE UP (ref 39–50)
HCT VFR BLD CALC: 42 % — SIGNIFICANT CHANGE UP (ref 39–50)
HDLC SERPL-MCNC: 39 MG/DL — LOW (ref 40–125)
HGB BLD-MCNC: 13.5 G/DL — SIGNIFICANT CHANGE UP (ref 13–17)
HGB BLD-MCNC: 14.2 G/DL — SIGNIFICANT CHANGE UP (ref 13–17)
LIPID PNL WITH DIRECT LDL SERPL: 122 MG/DL — SIGNIFICANT CHANGE UP
LYMPHOCYTES # BLD AUTO: 1.3 K/UL — SIGNIFICANT CHANGE UP (ref 1–3.3)
LYMPHOCYTES # BLD AUTO: 10 % — LOW (ref 13–44)
MCHC RBC-ENTMCNC: 29.4 PG — SIGNIFICANT CHANGE UP (ref 27–34)
MCHC RBC-ENTMCNC: 29.6 PG — SIGNIFICANT CHANGE UP (ref 27–34)
MCHC RBC-ENTMCNC: 33.7 GM/DL — SIGNIFICANT CHANGE UP (ref 32–36)
MCHC RBC-ENTMCNC: 33.7 GM/DL — SIGNIFICANT CHANGE UP (ref 32–36)
MCV RBC AUTO: 87.4 FL — SIGNIFICANT CHANGE UP (ref 80–100)
MCV RBC AUTO: 87.9 FL — SIGNIFICANT CHANGE UP (ref 80–100)
MONOCYTES # BLD AUTO: 0.9 K/UL — SIGNIFICANT CHANGE UP (ref 0–0.9)
MONOCYTES NFR BLD AUTO: 7 % — SIGNIFICANT CHANGE UP (ref 2–14)
NEUTROPHILS # BLD AUTO: 10.5 K/UL — HIGH (ref 1.8–7.4)
NEUTROPHILS NFR BLD AUTO: 82.1 % — HIGH (ref 43–77)
PLATELET # BLD AUTO: 266 K/UL — SIGNIFICANT CHANGE UP (ref 150–400)
PLATELET # BLD AUTO: 270 K/UL — SIGNIFICANT CHANGE UP (ref 150–400)
POTASSIUM SERPL-MCNC: 3.8 MMOL/L — SIGNIFICANT CHANGE UP (ref 3.5–5.3)
POTASSIUM SERPL-SCNC: 3.8 MMOL/L — SIGNIFICANT CHANGE UP (ref 3.5–5.3)
RBC # BLD: 4.55 M/UL — SIGNIFICANT CHANGE UP (ref 4.2–5.8)
RBC # BLD: 4.81 M/UL — SIGNIFICANT CHANGE UP (ref 4.2–5.8)
RBC # FLD: 13 % — SIGNIFICANT CHANGE UP (ref 10.3–14.5)
RBC # FLD: 13 % — SIGNIFICANT CHANGE UP (ref 10.3–14.5)
SODIUM SERPL-SCNC: 138 MMOL/L — SIGNIFICANT CHANGE UP (ref 135–145)
TOTAL CHOLESTEROL/HDL RATIO MEASUREMENT: 5 RATIO — SIGNIFICANT CHANGE UP (ref 3.4–9.6)
TRIGL SERPL-MCNC: 174 MG/DL — HIGH (ref 10–149)
WBC # BLD: 12.8 K/UL — HIGH (ref 3.8–10.5)
WBC # BLD: 8.4 K/UL — SIGNIFICANT CHANGE UP (ref 3.8–10.5)
WBC # FLD AUTO: 12.8 K/UL — HIGH (ref 3.8–10.5)
WBC # FLD AUTO: 8.4 K/UL — SIGNIFICANT CHANGE UP (ref 3.8–10.5)

## 2018-07-12 PROCEDURE — 93926 LOWER EXTREMITY STUDY: CPT | Mod: 26,RT

## 2018-07-12 RX ORDER — METOPROLOL TARTRATE 50 MG
1 TABLET ORAL
Qty: 60 | Refills: 0
Start: 2018-07-12 | End: 2018-08-10

## 2018-07-12 RX ORDER — LISINOPRIL 2.5 MG/1
2.5 TABLET ORAL DAILY
Qty: 0 | Refills: 0 | Status: DISCONTINUED | OUTPATIENT
Start: 2018-07-12 | End: 2018-07-13

## 2018-07-12 RX ORDER — ASPIRIN/CALCIUM CARB/MAGNESIUM 324 MG
1 TABLET ORAL
Qty: 0 | Refills: 0 | DISCHARGE
Start: 2018-07-12

## 2018-07-12 RX ORDER — ACETAMINOPHEN 500 MG
650 TABLET ORAL ONCE
Qty: 0 | Refills: 0 | Status: COMPLETED | OUTPATIENT
Start: 2018-07-12 | End: 2018-07-12

## 2018-07-12 RX ORDER — OXYCODONE AND ACETAMINOPHEN 5; 325 MG/1; MG/1
1 TABLET ORAL EVERY 4 HOURS
Qty: 0 | Refills: 0 | Status: DISCONTINUED | OUTPATIENT
Start: 2018-07-12 | End: 2018-07-13

## 2018-07-12 RX ADMIN — OXYCODONE AND ACETAMINOPHEN 1 TABLET(S): 5; 325 TABLET ORAL at 22:30

## 2018-07-12 RX ADMIN — Medication 25 MILLIGRAM(S): at 06:17

## 2018-07-12 RX ADMIN — OXYCODONE AND ACETAMINOPHEN 1 TABLET(S): 5; 325 TABLET ORAL at 01:05

## 2018-07-12 RX ADMIN — OXYCODONE AND ACETAMINOPHEN 1 TABLET(S): 5; 325 TABLET ORAL at 00:38

## 2018-07-12 RX ADMIN — Medication 81 MILLIGRAM(S): at 06:16

## 2018-07-12 RX ADMIN — Medication 650 MILLIGRAM(S): at 19:00

## 2018-07-12 RX ADMIN — CLOPIDOGREL BISULFATE 75 MILLIGRAM(S): 75 TABLET, FILM COATED ORAL at 06:17

## 2018-07-12 RX ADMIN — OXYCODONE AND ACETAMINOPHEN 1 TABLET(S): 5; 325 TABLET ORAL at 22:05

## 2018-07-12 RX ADMIN — ATORVASTATIN CALCIUM 40 MILLIGRAM(S): 80 TABLET, FILM COATED ORAL at 22:06

## 2018-07-12 RX ADMIN — Medication 25 MILLIGRAM(S): at 17:53

## 2018-07-12 RX ADMIN — Medication 650 MILLIGRAM(S): at 17:56

## 2018-07-12 NOTE — DISCHARGE NOTE ADULT - PLAN OF CARE
Patient remains chest pain free and understands post cath discharge instructions. Do not stop your aspirin or Plavix unless instructed to do so by your cardiologist, they help keep your stented coronary arteries open.   No heavy lifting, strenuous activity, bending, straining, or unnecessary stair climbing for 2 weeks. No driving for 2 days. You may shower 24 hours following the procedure but avoid baths/swimming for 1 week. Check your groin site for bleeding and/or swelling daily following procedure and call your doctor immediately if it occurs or if you experience increased pain at the site. Follow up with your cardiologist in 1-2 weeks. You may call [facility] if you have any questions/concerns regarding your procedure [phone]. Your blood pressure will be controlled. Continue with your blood pressure medications; eat a heart healthy diet with low salt diet; exercise regularly (consult with your physician or cardiologist first); maintain a heart healthy weight; if you smoke - quit (A resource to help you stop smoking is the [facility] – phone number [phone].); include healthy ways to manage stress. Continue to follow with your primary care physician or cardiologist. Your LDL cholesterol will be less than 70mg/dL Continue with your cholesterol medications. Eat a heart healthy diet that is low in saturated fats and salt, and includes whole grains, fruits, vegetables and lean protein; exercise regularly (consult with your physician or cardiologist first); maintain a heart healthy weight; if you smoke - quit (A resource to help you stop smoking is the [facility] – phone number [phone].). Continue to follow with your primary physician or cardiologist.

## 2018-07-12 NOTE — CONSULT NOTE ADULT - SUBJECTIVE AND OBJECTIVE BOX
pt evaluated full note to follow ---___---___---___---___---___---___ ---___---___---___---___---___---___---___---___---                  M E D I C A L   A T T E N D I N G    C O N S U L T A T I O N   N O T E  ---___---___---___---___---___---___ ---___---___---___---___---___---___---___---___---       Pt seen and examined by me approximately thirty minutes ago.  ++CHIEF COMPLAINT:   Patient is a 48y old  Male who originally presented with a chief complaint of chest pain (2018 05:09)    ++  HPI:  47 y/o  Obese Male (BMI 35) with PMH HLD presented to [facility] with new onset constant chest pain.   Pt with Trop 2.18 -> 38 loaded with ASA 325mg and Plavix 300mg started on Heparin gtt for ACS TTE revealed LVEF 50-55% with mild MR and mild LV systolic function.    Pt transferred for cardiac cath.    PCP - Dr. Minaya (2018 18:26)    pt today overall comfortable ambulating but has ecchymosis and some pain in the right groin cath access site..     ---___---___---___---___---___---  PAST MEDICAL & SURGICAL HISTORY:  Obesity (BMI 30-39.9)  Vitamin D deficiency  Hyperlipidemia  H/O umbilical hernia repair  H/O rotator cuff surgery: right      ---___---___---___---___---___---   FAMILY HISTORY:  No pertinent family history in first degree relatives    ---___---___---___---___---___---  SOCIAL HISTORY:  Alcohol: None reported  Smoking: None reported    ---___---___---___---___---___---  ALLERGIES:   No Known Allergies    ---___---___---___---___---___---  MEDICATIONS:  MEDICATIONS  (STANDING):  acetaminophen   Tablet. 650 milliGRAM(s) Oral once  aspirin  chewable 81 milliGRAM(s) Oral daily  atorvastatin 40 milliGRAM(s) Oral at bedtime  clopidogrel Tablet 75 milliGRAM(s) Oral daily  metoprolol tartrate 25 milliGRAM(s) Oral two times a day    MEDICATIONS  (PRN):  oxyCODONE    5 mG/acetaminophen 325 mG 1 Tablet(s) Oral every 4 hours PRN Moderate Pain (4 - 6)    ---___---___---___---___---___---  REVIEW OF SYSTEM:    GEN: no fever, no chills, pain as above  RESP: no SOB, no cough, no sputum  CVS: no chest pain, no palpitations, no edema  GI: no abdominal pain, no nausea, no vomiting, no constipation, no diarrhea  : no dysuria, no frequency, no hematuria  Neuro: no headache, no dizziness  PSYCH: no anxiety, no depression  Derm : no itching, no rash    ---___---___---___---___---___---  VITAL SIGNS:  T(C): 36.6 (18 @ 06:14), Max: 36.9 (18 @ 22:00)  HR: 65 (18 @ 06:14) (65 - 81)  BP: 119/73 (18 @ 06:14) (114/71 - 145/91)  RR: 17 (18 @ 06:14) (16 - 18)  SpO2: 96% (18 @ 06:14) (96% - 100%)    I&O's Summary    2018 07:01  -  2018 07:00  --------------------------------------------------------  IN: 480 mL / OUT: 0 mL / NET: 480 mL    2018 07:01  -  2018 12:50  --------------------------------------------------------  IN: 240 mL / OUT: 0 mL / NET: 240 mL    ---___---___---___---___---___---  PHYSICAL EXAM:    GEN: A&O X 3 , NAD , comfortable  HEENT: NCAT, PERRL, MMM, hearing intact  Neck: supple , no JVD  CVS: S1S2 , regular , No M/R/G appreciated  PULM: CTA B/L,  no W/R/R appreciated  ABD.: soft. non tender, non distended,  bowel sounds present  Extrem: intact pulses , no edema      significant ecchymosis at Rt groin area with mild tenderness..   Derm: No rash , no ecchymoses  PSYCH : normal mood,  no delusion not anxious     ---___---___---___---___---___---            LAB AND IMAGIN.5   8.4   )-----------( 266      ( 2018 05:58 )             40.0        07-12    138  |  101  |  12  ----------------------------<  135<H>  3.8   |  24  |  0.80    Ca    8.8      2018 00:22    TPro  7.9  /  Alb  4.0  /  TBili  1.1  /  DBili  x   /  AST  64<H>  /  ALT  45  /  AlkPhos  87  07-11    PT/INR - ( 2018 07:50 )   PT: 11.1 sec;   INR: 1.02 ratio         PTT - ( 2018 07:50 )  PTT:30.4 sec            CARDIAC MARKERS ( 2018 15:36 )  38.000 ng/mL / x     / 946 U/L / x     / 71.2 ng/mL  CARDIAC MARKERS ( 2018 07:50 )  2.180 ng/mL / x     / x     / x     / x           Lipid profile:  (18)     Total: 196     LDL  : 122     HDL  :39     TG   :174     < from: Cardiac Cath Lab - Adult (18 @ 18:21) >  CORONARY VESSELS: The coronary circulation is left dominant.  LM:   --  LM: Normal.  LAD:   --  LAD: Angiography showed minor luminal irregularities with no  flow limiting lesions.  CX:   --  LPL2: There was a 70 % stenosis.  RCA:   --  RCA: Angiography showed minor luminal irregularities with no  flow limiting lesions.  COMPLICATIONS: There were no complications.  DIAGNOSTIC IMPRESSIONS: There is significant singlevessel coronary artery  disease. Successful PCI of LPL2 using a drug eluting stent was performed.  DIAGNOSTIC RECOMMENDATIONS: Add aspirin and plavix. Patient management  should include aggressive risk factor modification.  INTERVENTIONAL RECOMMENDATIONS: Add aspirin and plavix.  < end of copied text >     ---___---___---___---___---___---___ ---___---___---___---___---                         A S S E S S M E N T   A N D   P L A N :      **CAD, post NSTEMI, post PCI as above  --- ASA, Plavix, Statin, Beta blocker  --- check Arterial duplex of Rt groin, r/o pseudoaneurysm  --- pain mgmt   --- cardio f/u and mgmt    **HLD  ---contineu statin  --- diet, weight loss an life style modification reviewed with pt and wife    Continue Current medications otherwise and monitor.     -GI/DVT Prophylaxis.  close follow up with cardio as outpatient    --------------------------------------------  Case discussed with pt, wife  Education given on findings and plan of care. all questions answered.   ___________________________  Will follow with you.  Thank you,  RASHEEDA Mahmood D.O.  Pager: [phone]

## 2018-07-12 NOTE — DISCHARGE NOTE ADULT - MEDICATION SUMMARY - MEDICATIONS TO TAKE
I will START or STAY ON the medications listed below when I get home from the hospital:    Aspirin Enteric Coated 81 mg oral delayed release tablet  -- 1 tab(s) by mouth once a day  -- Indication: For Helping keep stented arteries open    atorvastatin 40 mg oral tablet  -- 1 tab(s) by mouth once a day (at bedtime) - home  -- Indication: For Hyperlipidemia    clopidogrel 75 mg oral tablet  -- 1 tab(s) by mouth once a day  -- Indication: For Helping keep stented arteries open    metoprolol tartrate 25 mg oral tablet  -- 1 tab(s) by mouth 2 times a day - hospital  -- Indication: For Hypertension I will START or STAY ON the medications listed below when I get home from the hospital:    Aspirin Enteric Coated 81 mg oral delayed release tablet  -- 1 tab(s) by mouth once a day  -- Indication: For Helping keep stented arteries open    Zestril 2.5 mg oral tablet  -- 1 tab(s) by mouth once a day  -- Indication: For Heart protection/high blood pressure    atorvastatin 40 mg oral tablet  -- 1 tab(s) by mouth once a day (at bedtime) - home  -- Indication: For Hyperlipidemia    clopidogrel 75 mg oral tablet  -- 1 tab(s) by mouth once a day  -- Indication: For Helping keep stented arteries open    metoprolol tartrate 25 mg oral tablet  -- 1 tab(s) by mouth 2 times a day - hospital  -- Indication: For Hypertension

## 2018-07-12 NOTE — DISCHARGE NOTE ADULT - CARE PLAN
Principal Discharge DX:	Coronary artery disease of native artery of native heart with stable angina pectoris  Goal:	Patient remains chest pain free and understands post cath discharge instructions.  Assessment and plan of treatment:	Do not stop your aspirin or Plavix unless instructed to do so by your cardiologist, they help keep your stented coronary arteries open.   No heavy lifting, strenuous activity, bending, straining, or unnecessary stair climbing for 2 weeks. No driving for 2 days. You may shower 24 hours following the procedure but avoid baths/swimming for 1 week. Check your groin site for bleeding and/or swelling daily following procedure and call your doctor immediately if it occurs or if you experience increased pain at the site. Follow up with your cardiologist in 1-2 weeks. You may call [facility] if you have any questions/concerns regarding your procedure [phone].  Secondary Diagnosis:	Hypertension, unspecified type  Goal:	Your blood pressure will be controlled.  Assessment and plan of treatment:	Continue with your blood pressure medications; eat a heart healthy diet with low salt diet; exercise regularly (consult with your physician or cardiologist first); maintain a heart healthy weight; if you smoke - quit (A resource to help you stop smoking is the [facility] [employer] – phone number [phone].); include healthy ways to manage stress. Continue to follow with your primary care physician or cardiologist.  Secondary Diagnosis:	Hyperlipidemia, unspecified hyperlipidemia type  Goal:	Your LDL cholesterol will be less than 70mg/dL  Assessment and plan of treatment:	Continue with your cholesterol medications. Eat a heart healthy diet that is low in saturated fats and salt, and includes whole grains, fruits, vegetables and lean protein; exercise regularly (consult with your physician or cardiologist first); maintain a heart healthy weight; if you smoke - quit (A resource to help you stop smoking is the [facility] [employer] [facility] – phone number [phone].). Continue to follow with your primary physician or cardiologist.

## 2018-07-12 NOTE — DISCHARGE NOTE ADULT - CARE PROVIDER_API CALL
Billy Rodriguez (DO), Cardiology; Interventional Cardiology  [address] [place] [address]  Phone: [phone]  Fax: [phone]

## 2018-07-12 NOTE — PROGRESS NOTE ADULT - SUBJECTIVE AND OBJECTIVE BOX
CARDIOLOGY     PROGRESS  NOTE   ________________________________________________    CHIEF COMPLAINT:Patient is a 48y old  Male who presents with a chief complaint of chest pain (12 Jul 2018 05:09)  s/p stent.  	  REVIEW OF SYSTEMS:  CONSTITUTIONAL: No fever, weight loss, or fatigue  EYES: No eye pain, visual disturbances, or discharge  ENT:  No difficulty hearing, tinnitus, vertigo; No sinus or throat pain  NECK: No pain or stiffness  RESPIRATORY: No cough, wheezing, chills or hemoptysis; No Shortness of Breath  CARDIOVASCULAR: No chest pain, palpitations, passing out, dizziness, or leg swelling  GASTROINTESTINAL: No abdominal or epigastric pain. No nausea, vomiting, or hematemesis; No diarrhea or constipation. No melena or hematochezia.  GENITOURINARY: No dysuria, frequency, hematuria, or incontinence  NEUROLOGICAL: No headaches, memory loss, loss of strength, numbness, or tremors  SKIN: No itching, burning, rashes, or lesions   LYMPH Nodes: No enlarged glands  ENDOCRINE: No heat or cold intolerance; No hair loss  MUSCULOSKELETAL: No joint pain or swelling; No muscle, back, or extremity pain  PSYCHIATRIC: No depression, anxiety, mood swings, or difficulty sleeping  HEME/LYMPH: No easy bruising, or bleeding gums  ALLERGY AND IMMUNOLOGIC: No hives or eczema	    [ ] All others negative	  [ ] Unable to obtain    PHYSICAL EXAM:  T(C): 36.6 (07-12-18 @ 06:14), Max: 36.9 (07-11-18 @ 22:00)  HR: 65 (07-12-18 @ 06:14) (65 - 81)  BP: 119/73 (07-12-18 @ 06:14) (114/71 - 145/91)  RR: 17 (07-12-18 @ 06:14) (16 - 18)  SpO2: 96% (07-12-18 @ 06:14) (96% - 100%)  Wt(kg): --  I&O's Summary    11 Jul 2018 07:01  -  12 Jul 2018 07:00  --------------------------------------------------------  IN: 480 mL / OUT: 0 mL / NET: 480 mL    12 Jul 2018 07:01  -  12 Jul 2018 12:28  --------------------------------------------------------  IN: 240 mL / OUT: 0 mL / NET: 240 mL        Appearance: Normal	  HEENT:   Normal oral mucosa, PERRL, EOMI	  Lymphatic: No lymphadenopathy  Cardiovascular: Normal S1 S2, No JVD, + murmurs, No edema  Respiratory: Lungs clear to auscultation	  Psychiatry: A & O x 3, Mood & affect appropriate  Gastrointestinal:  Soft, Non-tender, + BS	  Skin: No rashes, No ecchymoses, No cyanosis	  Neurologic: Non-focal  Extremities: Normal range of motion, No clubbing, cyanosis or edema  Vascular: Peripheral pulses palpable 2+ bilaterally    MEDICATIONS  (STANDING):  acetaminophen   Tablet. 650 milliGRAM(s) Oral once  aspirin  chewable 81 milliGRAM(s) Oral daily  atorvastatin 40 milliGRAM(s) Oral at bedtime  clopidogrel Tablet 75 milliGRAM(s) Oral daily  metoprolol tartrate 25 milliGRAM(s) Oral two times a day      TELEMETRY: 	    ECG:  	  RADIOLOGY:  OTHER: 	  	  LABS:	 	    CARDIAC MARKERS:  CARDIAC MARKERS ( 11 Jul 2018 15:36 )  38.000 ng/mL / x     / 946 U/L / x     / 71.2 ng/mL  CARDIAC MARKERS ( 11 Jul 2018 07:50 )  2.180 ng/mL / x     / x     / x     / x                                    13.5   8.4   )-----------( 266      ( 12 Jul 2018 05:58 )             40.0     07-12    138  |  101  |  12  ----------------------------<  135<H>  3.8   |  24  |  0.80    Ca    8.8      12 Jul 2018 00:22    TPro  7.9  /  Alb  4.0  /  TBili  1.1  /  DBili  x   /  AST  64<H>  /  ALT  45  /  AlkPhos  87  07-11    proBNP:   Lipid Profile: Cholesterol 196    HDL 39      HgA1c:   TSH:   PT/INR - ( 11 Jul 2018 07:50 )   PT: 11.1 sec;   INR: 1.02 ratio         PTT - ( 11 Jul 2018 07:50 )  PTT:30.4 sec  < from: Cardiac Cath Lab - Adult (07.11.18 @ 18:21) >  LM:   --  LM: Normal.  LAD:   --  LAD: Angiography showed minor luminal irregularities with no  flow limiting lesions.  CX:   --  LPL2: There was a 70 % stenosis.  RCA:   --  RCA: Angiography showed minor luminal irregularities with no  flow limiting lesions.  COMPLICATIONS: There were no complications.  DIAGNOSTIC IMPRESSIONS: There is significant singlevessel coronary artery  disease. Successful PCI of LPL2 using a drug eluting stent was performed.  DIAGNOSTIC RECOMMENDATIONS: Add aspirin and plavix. Patient management  should include aggressive risk factor modification.    < from: Transthoracic Echocardiogram (07.11.18 @ 11:42) >  1. Mild mitral regurgitation.  2. Normal left ventricular internal dimensions and wall  thicknesses.  3. Endocardium not well visualized; grossly mild left  ventricular systolic dysfunction.  Although not all  myocardial segments are well visualized, the mid  inferolateral wall appears hypokinetic.  4. Normal right ventricular size and function.          Assessment and plan  ---------------------------  s/p non q wave MI  s/p stent  continue asa/plavix/beta blocker  add ace  increase ambulation

## 2018-07-12 NOTE — DISCHARGE NOTE ADULT - HOSPITAL COURSE
HPI:  47 y/o  Obese Male (BMI 35) with PMH HLD p/w new onset constant chest pain x AM. CP began at 4 AM and lasted till 6:30AM, woke him up from sleep and worsened w/ lying on his side, described as a pressure, and associated w/ b/l neck and back pain which occurred at onset of CP. Pt believed pain 2/2 to GERD and had associated belching. Otherwise denies chills, fever, weakness, SOB, abdominal pain, N/V/D, or any other complaints. Pt denied any significant Hx of tobacco use, alcohol use, or illicit drug use, and denies any FHx of SCD or cardiopulmonary disease.  Pt with Trop 2.18 -> 38 loaded with ASA 325mg and Plavix 300mg started on Heparin gtt for ACS TTE revealed LVEF 50-55% with mild MR and mild LV systolic function.  Pt transferred for cardiac cath with no chest pain brought to Lab.    PCP - Dr. Minaya (11 Jul 2018 18:26)    7/11 cardiac cath with one stent to the 2nd LPL. Right groin site ecchymotic, soft to touch. HPI:  47 y/o  Obese Male (BMI 35) with PMH HLD p/w new onset constant chest pain x AM. CP began at 4 AM and lasted till 6:30AM, woke him up from sleep and worsened w/ lying on his side, described as a pressure, and associated w/ b/l neck and back pain which occurred at onset of CP. Pt believed pain 2/2 to GERD and had associated belching. Otherwise denies chills, fever, weakness, SOB, abdominal pain, N/V/D, or any other complaints. Pt denied any significant Hx of tobacco use, alcohol use, or illicit drug use, and denies any FHx of SCD or cardiopulmonary disease.  Pt with Trop 2.18 -> 38 loaded with ASA 325mg and Plavix 300mg started on Heparin gtt for ACS TTE revealed LVEF 50-55% with mild MR and mild LV systolic function.  Pt transferred for cardiac cath with no chest pain brought to Lab.    PCP - Dr. Minaya (11 Jul 2018 18:26)    7/11 cardiac cath with one stent to the 2nd LPL. Right groin site ecchymotic, soft to touch. As per Dr. Leigh and Dr. Rodriguez the patient can be discharged. HPI:  49 y/o  Obese Male (BMI 35) with PMH HLD p/w new onset constant chest pain x AM. CP began at 4 AM and lasted till 6:30AM, woke him up from sleep and worsened w/ lying on his side, described as a pressure, and associated w/ b/l neck and back pain which occurred at onset of CP. Pt believed pain 2/2 to GERD and had associated belching. Otherwise denies chills, fever, weakness, SOB, abdominal pain, N/V/D, or any other complaints. Pt denied any significant Hx of tobacco use, alcohol use, or illicit drug use, and denies any FHx of SCD or cardiopulmonary disease.  Pt with Trop 2.18 -> 38 loaded with ASA 325mg and Plavix 300mg started on Heparin gtt for ACS TTE revealed LVEF 50-55% with mild MR and mild LV systolic function.  Pt transferred for cardiac cath with no chest pain brought to Lab.    PCP - Dr. Minaya (11 Jul 2018 18:26)    7/11 cardiac cath with one stent to the 2nd LPL. The patient developed a right groin hematoma that was compressed by night NP. Ultrasound was performed which was negative for psuedoaneurysm but 8.3 cm hematoma present. The findings were discussed with Dr. Leigh who stated the patient is stable.  Right groin site ecchymotic, soft to touch. As per Dr. Leigh and Dr. Rodriguez the patient can be discharged.

## 2018-07-12 NOTE — DISCHARGE NOTE ADULT - ADDITIONAL INSTRUCTIONS
Do not stop your aspirin or Plavix unless instructed to do so by your cardiologist, they help keep your stented coronary arteries open.   Follow up with your cardiologist and primary care provider in 1-2 weeks. Do not stop your aspirin or Plavix unless instructed to do so by your cardiologist, they help keep your stented coronary arteries open.   Follow up with your cardiologist within 1 week due to your right groin hematoma. If you notice any further bleeding or swelling please proceed to the ED

## 2018-07-12 NOTE — DISCHARGE NOTE ADULT - PATIENT PORTAL LINK FT
You can access the [facility] Patient Portal, offered by [facility], by registering with the following website: [email or web address]

## 2018-07-13 VITALS
SYSTOLIC BLOOD PRESSURE: 124 MMHG | TEMPERATURE: 98 F | DIASTOLIC BLOOD PRESSURE: 76 MMHG | RESPIRATION RATE: 18 BRPM | OXYGEN SATURATION: 99 % | HEART RATE: 78 BPM

## 2018-07-13 LAB
ANION GAP SERPL CALC-SCNC: 12 MMOL/L — SIGNIFICANT CHANGE UP (ref 5–17)
BUN SERPL-MCNC: 12 MG/DL — SIGNIFICANT CHANGE UP (ref 7–23)
CALCIUM SERPL-MCNC: 9 MG/DL — SIGNIFICANT CHANGE UP (ref 8.4–10.5)
CHLORIDE SERPL-SCNC: 100 MMOL/L — SIGNIFICANT CHANGE UP (ref 96–108)
CO2 SERPL-SCNC: 25 MMOL/L — SIGNIFICANT CHANGE UP (ref 22–31)
CREAT SERPL-MCNC: 1.01 MG/DL — SIGNIFICANT CHANGE UP (ref 0.5–1.3)
GLUCOSE SERPL-MCNC: 100 MG/DL — HIGH (ref 70–99)
HCT VFR BLD CALC: 41.2 % — SIGNIFICANT CHANGE UP (ref 39–50)
HGB BLD-MCNC: 13.6 G/DL — SIGNIFICANT CHANGE UP (ref 13–17)
MCHC RBC-ENTMCNC: 29.1 PG — SIGNIFICANT CHANGE UP (ref 27–34)
MCHC RBC-ENTMCNC: 33.1 GM/DL — SIGNIFICANT CHANGE UP (ref 32–36)
MCV RBC AUTO: 87.8 FL — SIGNIFICANT CHANGE UP (ref 80–100)
PLATELET # BLD AUTO: 263 K/UL — SIGNIFICANT CHANGE UP (ref 150–400)
POTASSIUM SERPL-MCNC: 4.2 MMOL/L — SIGNIFICANT CHANGE UP (ref 3.5–5.3)
POTASSIUM SERPL-SCNC: 4.2 MMOL/L — SIGNIFICANT CHANGE UP (ref 3.5–5.3)
RBC # BLD: 4.69 M/UL — SIGNIFICANT CHANGE UP (ref 4.2–5.8)
RBC # FLD: 12.8 % — SIGNIFICANT CHANGE UP (ref 10.3–14.5)
SODIUM SERPL-SCNC: 137 MMOL/L — SIGNIFICANT CHANGE UP (ref 135–145)
WBC # BLD: 7.8 K/UL — SIGNIFICANT CHANGE UP (ref 3.8–10.5)
WBC # FLD AUTO: 7.8 K/UL — SIGNIFICANT CHANGE UP (ref 3.8–10.5)

## 2018-07-13 PROCEDURE — 93926 LOWER EXTREMITY STUDY: CPT

## 2018-07-13 PROCEDURE — 93005 ELECTROCARDIOGRAM TRACING: CPT

## 2018-07-13 PROCEDURE — 93458 L HRT ARTERY/VENTRICLE ANGIO: CPT | Mod: 59

## 2018-07-13 PROCEDURE — 80048 BASIC METABOLIC PNL TOTAL CA: CPT

## 2018-07-13 PROCEDURE — C1769: CPT

## 2018-07-13 PROCEDURE — C1887: CPT

## 2018-07-13 PROCEDURE — 85027 COMPLETE CBC AUTOMATED: CPT

## 2018-07-13 PROCEDURE — 80061 LIPID PANEL: CPT

## 2018-07-13 PROCEDURE — 99153 MOD SED SAME PHYS/QHP EA: CPT

## 2018-07-13 PROCEDURE — 99152 MOD SED SAME PHYS/QHP 5/>YRS: CPT

## 2018-07-13 PROCEDURE — C1725: CPT

## 2018-07-13 PROCEDURE — C1894: CPT

## 2018-07-13 PROCEDURE — C9606: CPT | Mod: LC

## 2018-07-13 PROCEDURE — C1874: CPT

## 2018-07-13 RX ADMIN — CLOPIDOGREL BISULFATE 75 MILLIGRAM(S): 75 TABLET, FILM COATED ORAL at 06:03

## 2018-07-13 RX ADMIN — Medication 81 MILLIGRAM(S): at 06:04

## 2018-07-13 RX ADMIN — LISINOPRIL 2.5 MILLIGRAM(S): 2.5 TABLET ORAL at 06:03

## 2018-07-13 RX ADMIN — Medication 25 MILLIGRAM(S): at 06:04

## 2018-07-13 NOTE — PROGRESS NOTE ADULT - SUBJECTIVE AND OBJECTIVE BOX
_________________________________________________________________________________________  ========>>  M E D I C A L   A T T E N D I N G    F O L L O W  U P  N O T E  <<=========  -----------------------------------------------------------------------------------------------------    - Patient seen and examined by me.  - In summary, patient is a 48y year old man who originally presented with   - Patient today overall doing ok, comfortable, eating OK.     ==================>> REVIEW OF SYSTEM <<=================    GEN: no fever, no chills, no pain  RESP: no SOB, no cough, no sputum  CVS: no chest pain, no palpitations, no edema  GI: no abdominal pain, no nausea, no constipation, no diarrhea  : no dysuria, no frequency, no hematuria  Neuro: no headache, no dizziness  Derm : no itching, no rash    ==================>> PHYSICAL EXAM <<=================    GEN: A&O X 3 , NAD , comfortable  HEENT: NCAT, PERRL, MMM, hearing intact  Neck: supple , no JVD  CVS: S1S2 , regular , No M/R/G appreciated  PULM: CTA B/L,  no W/R/R appreciated  ABD.: soft. non tender, non distended,  bowel sounds present  Extrem: intact pulses , no edema   PSYCH : normal mood,  not anxious      ==================>> MEDICATIONS <<====================    MEDICATIONS  (STANDING):  aspirin  chewable 81 milliGRAM(s) Oral daily  atorvastatin 40 milliGRAM(s) Oral at bedtime  clopidogrel Tablet 75 milliGRAM(s) Oral daily  lisinopril 2.5 milliGRAM(s) Oral daily  metoprolol tartrate 25 milliGRAM(s) Oral two times a day    MEDICATIONS  (PRN):  oxyCODONE    5 mG/acetaminophen 325 mG 1 Tablet(s) Oral every 4 hours PRN Moderate Pain (4 - 6)      ==================>> VITAL SIGNS <<==================    T(C): 36.7 (07-13-18 @ 05:34), Max: 37.1 (07-12-18 @ 21:14)  HR: 72 (07-13-18 @ 05:34) (71 - 74)  BP: 113/59 (07-13-18 @ 05:34) (113/59 - 121/79)  RR: 18 (07-13-18 @ 05:34) (18 - 18)  SpO2: 100% (07-13-18 @ 05:34) (96% - 100%)    I&O's Summary    12 Jul 2018 07:01 - 13 Jul 2018 07:00  --------------------------------------------------------  IN: 830 mL / OUT: 0 mL / NET: 830 mL    13 Jul 2018 07:01  -  13 Jul 2018 09:15  --------------------------------------------------------  IN: 120 mL / OUT: 0 mL / NET: 120 mL     ==================>> LAB AND IMAGING <<==================                        13.6   7.8   )-----------( 263      ( 13 Jul 2018 05:44 )             41.2        07-13    137  |  100  |  12  ----------------------------<  100<H>  4.2   |  25  |  1.01    Ca    9.0      13 Jul 2018 05:44              CARDIAC MARKERS ( 11 Jul 2018 15:36 )  38.000 ng/mL / x     / 946 U/L / x     / 71.2 ng/mL     Lipid profile:  (07-12-18)     Total: 196     LDL  : 122     HDL  :39     TG   :174     < from: US Duplex Arterial Lower Ext Ltd, Right (07.12.18 @ 15:20) >  IMPRESSION:  8.3 cm right groin hematoma without evidence of pseudoaneurysm.  < end of copied text >    ___________________________________________________________________________________  ===============>>  A S S E S S M E N T   A N D   P L A N <<===============  ------------------------------------------------------------------------------------------      **CAD, post NSTEMI, post PCI as above  --- ASA, Plavix, Statin, Beta blocker  --- Arterial duplex of Rt groin as above: + hematoma; no pseudoaneurysm  --- pain mgmt   --- cardio f/u and mgmt  --- increase activity as tolerated   **HLD  ---contineu statin  --- diet, weight loss an life style modification reviewed with pt    Continue Current medications otherwise and monitor.     -GI/DVT Prophylaxis.  close follow up with cardio as outpatient  dispo planing   --------------------------------------------  Case discussed with pt  Education given on findings and plan of care. all questions answered.   ___________________________  Will follow with you.  Thank you,  RASHEEDA Mahmood D.O.  Pager: [phone]

## 2018-07-13 NOTE — DIETITIAN INITIAL EVALUATION ADULT. - ADHERENCE
fair/patient reports he eats well at home, as his family keeps him accountable, but struggles to make healthy food choices when at work. He has Cheerios with milk and fruit at home, when he gets to work he usually has rivera egg and cheese sandwich, he usually gets some form of take out (Chinese, pizza, burger and fries), and dinner is prepared by his wife at home, generally healthy, consumes vegetarian meals 1-2 nights/week

## 2018-07-13 NOTE — DIETITIAN INITIAL EVALUATION ADULT. - NS AS NUTRI INTERV ED CONTENT
DASH education provided. Discussed foods high in cholesterol to limit/avoid, provided suggestions on how to improve typical usual intake to increase compliance to DASH diet. encouraged adequate intake of whole grains, fruits and vegetables to increase total fiber intake. encouraged patient as feasible to pack lunch a few days/week to bring to work to help avoid temptation to consume non compliant foods. patient agreeable. In response to patient's question regarding weight loss, patient made aware even modest weight loss 5-10% will improve overall health

## 2018-07-13 NOTE — PROGRESS NOTE ADULT - SUBJECTIVE AND OBJECTIVE BOX
Patient is a 48y old  Male who presents with  chest pain (12 Jul 2018 05:09) now s/p cardiac cath MAKAYLA x 1 LPL via right femoral artery access.           Allergies    No Known Allergies    Intolerances        Medications:  aspirin  chewable 81 milliGRAM(s) Oral daily  atorvastatin 40 milliGRAM(s) Oral at bedtime  clopidogrel Tablet 75 milliGRAM(s) Oral daily  lisinopril 2.5 milliGRAM(s) Oral daily  metoprolol tartrate 25 milliGRAM(s) Oral two times a day  oxyCODONE    5 mG/acetaminophen 325 mG 1 Tablet(s) Oral every 4 hours PRN      Vitals:  T(C): 37.1 (07-12-18 @ 21:14), Max: 37.1 (07-12-18 @ 21:14)  HR: 74 (07-12-18 @ 21:14) (71 - 74)  BP: 115/67 (07-12-18 @ 21:14) (115/67 - 121/79)  BP(mean): --  RR: 18 (07-12-18 @ 21:14) (18 - 18)  SpO2: 96% (07-12-18 @ 21:14) (96% - 96%)  Wt(kg): --  Daily     Daily   I&O's Summary    11 Jul 2018 07:01  -  12 Jul 2018 07:00  --------------------------------------------------------  IN: 480 mL / OUT: 0 mL / NET: 480 mL    12 Jul 2018 07:01  -  13 Jul 2018 06:23  --------------------------------------------------------  IN: 680 mL / OUT: 0 mL / NET: 680 mL          Physical Exam:  Appearance: Normal  Eyes: PERRL, EOMI  HENT: Normal oral mus[place], NC/AT  Cardiovascular: S1S2, RRR, No M/R/G, no JVD, No Lower extremity edema  Procedural Access Site: Right femoral artery access. No hematoma, Non-tender to palpation, 2+ pulse, No bruit, No Ecchymosis  Respiratory: Clear to auscultation bilaterally  Gastrointestinal: Soft, Non tender, Normal Bowel Sounds  Musculoskeletal: No clubbing, No joint deformity   Neurologic: Non-focal  Psychiatry: AAOx3, Mood & affect appropriate  Skin: No rashes, No ecchymoses, No cyanosis    07-13    137  |  100  |  12  ----------------------------<  100<H>  4.2   |  25  |  1.01    Ca    9.0      13 Jul 2018 05:44    TPro  7.9  /  Alb  4.0  /  TBili  1.1  /  DBili  x   /  AST  64<H>  /  ALT  45  /  AlkPhos  87  07-11    PT/INR - ( 11 Jul 2018 07:50 )   PT: 11.1 sec;   INR: 1.02 ratio         PTT - ( 11 Jul 2018 07:50 )  PTT:30.4 sec  CARDIAC MARKERS ( 11 Jul 2018 15:36 )  38.000 ng/mL / x     / 946 U/L / x     / 71.2 ng/mL  CARDIAC MARKERS ( 11 Jul 2018 07:50 )  2.180 ng/mL / x     / x     / x     / x        Interpretation of Telemetry: SR 60-70bpm    ASSESSMENT/PLAN   Patient is a 48y old  Male who presents with  chest pain (12 Jul 2018 05:09) now s/p cardiac cath MAKAYLA x 1 LPL via right femoral artery access. Pt tolerated the procedure well, cardiac cath site + large ecchymosis, + hematoma resolving ( Sono 8.3 cm) neg pseudo. Nutritionist consult pending.

## 2018-07-13 NOTE — PROGRESS NOTE ADULT - SUBJECTIVE AND OBJECTIVE BOX
- Patient seen and examined.  - In summary, patient is a 48 year old man who presented with chest pain (2018 05:09)  - Today, patient is without complaints.         *****MEDICATIONS:    MEDICATIONS  (STANDING):  aspirin  chewable 81 milliGRAM(s) Oral daily  atorvastatin 40 milliGRAM(s) Oral at bedtime  clopidogrel Tablet 75 milliGRAM(s) Oral daily  lisinopril 2.5 milliGRAM(s) Oral daily  metoprolol tartrate 25 milliGRAM(s) Oral two times a day    MEDICATIONS  (PRN):  oxyCODONE    5 mG/acetaminophen 325 mG 1 Tablet(s) Oral every 4 hours PRN Moderate Pain (4 - 6)           ***** REVIEW OF SYSTEM:  GEN: no fever, no chills, no pain  RESP: no SOB, no cough, no sputum  CVS: no chest pain, no palpitations, no edema  GI: no abdominal pain, no nausea, no vomiting, no constipation, no diarrhea  : no dysuria, no frequency  NEURO: no headache, no dizziness  PSYCH: no depression, not anxious  Derm : no itching, no rash         ***** VITAL SIGNS:  T(F): 98.4 (18 @ 09:56), Max: 98.8 (18 @ 21:14)  HR: 78 (18 @ 09:56) (71 - 78)  BP: 124/76 (18 @ 09:56) (113/59 - 124/76)  RR: 18 (18 @ 09:56) (18 - 18)  SpO2: 99% (18 @ 09:56) (96% - 100%)  Wt(kg): --  ,   I&O's Summary    2018 07:  -  2018 07:00  --------------------------------------------------------  IN: 830 mL / OUT: 0 mL / NET: 830 mL    2018 07:  -  2018 10:52  --------------------------------------------------------  IN: 120 mL / OUT: 0 mL / NET: 120 mL             *****PHYSICAL EXAM:  GEN: A&O X 3 , NAD , comfortable  HEENT: NCAT, EOMI, MMM, no icterus  NECK: Supple, No JVD  CVS: S1S2 , regular , No M/R/G appreciated  PULM: CTA B/L,  no W/R/R appreciated  ABD.: soft. non tender, non distended,  bowel sounds present  Extrem: intact pulses , no edema noted  Derm: No rash or ecchymosis noted  PSYCH: normal mood, no depression, not anxious         *****LAB AND IMAGIN.6   7.8   )-----------( 263      ( 2018 05:44 )             41.2               07-13    137  |  100  |  12  ----------------------------<  100<H>  4.2   |  25  |  1.01    Ca    9.0      2018 05:44             CARDIAC MARKERS ( 2018 15:36 )  38.000 ng/mL / x     / 946 U/L / x     / 71.2 ng/mL                [All pertinent recent Imaging/Reports reviewed]         *****A S S E S S M E N T   A N D   P L A N :  48M s/p NSTEMI  s/p stent  continue asa/plavix  beta blocker  ace  statin  LSM discussed  OK for DC home    __________________________  ALBERTO Rodriguez D.O.

## 2018-07-13 NOTE — DIETITIAN INITIAL EVALUATION ADULT. - ENERGY NEEDS
height: 5'7'" weight 225 pounds BMI: 35 IBW: 148 pounds (+/-10%)  pertinent info: patient admitted s/p cardiac catherization  skin: no edema, no pressure injuries per documentation

## 2018-07-13 NOTE — DIETITIAN INITIAL EVALUATION ADULT. - OTHER INFO
Nutrition consult received for diet education. Patient denies food allergies, denies difficulty chewing/swallowing, without complaints of GI distress. + BM 7/12. Good appetite and PO intake at present and PTA. Patient receptive to diet education.

## 2019-03-25 NOTE — ED PROVIDER NOTE - DATA REVIEWED, MDM
Notified patient of low testosterone levels. Scheduled follow up labs per MD.   vital signs/nurses notes/old records

## 2019-08-15 NOTE — H&P PST ADULT - LAST ECHOCARDIOGRAM
Patient aware that the provider is out of the office and is okay waiting until they return for the message to be addressed.     Thank you   none

## 2019-09-22 ENCOUNTER — INPATIENT (INPATIENT)
Facility: HOSPITAL | Age: 49
LOS: 0 days | Discharge: HOME | DRG: 303 | End: 2019-09-23
Attending: INTERNAL MEDICINE | Admitting: INTERNAL MEDICINE
Payer: COMMERCIAL

## 2019-09-22 VITALS
TEMPERATURE: 98 F | HEART RATE: 86 BPM | OXYGEN SATURATION: 98 % | HEIGHT: 67 IN | SYSTOLIC BLOOD PRESSURE: 149 MMHG | RESPIRATION RATE: 18 BRPM | DIASTOLIC BLOOD PRESSURE: 92 MMHG | WEIGHT: 220.02 LBS

## 2019-09-22 DIAGNOSIS — Z98.890 OTHER SPECIFIED POSTPROCEDURAL STATES: Chronic | ICD-10-CM

## 2019-09-22 DIAGNOSIS — R07.9 CHEST PAIN, UNSPECIFIED: ICD-10-CM

## 2019-09-22 DIAGNOSIS — Z95.5 PRESENCE OF CORONARY ANGIOPLASTY IMPLANT AND GRAFT: Chronic | ICD-10-CM

## 2019-09-22 PROBLEM — E78.5 HYPERLIPIDEMIA, UNSPECIFIED: Chronic | Status: ACTIVE | Noted: 2017-08-28

## 2019-09-22 PROBLEM — E66.9 OBESITY, UNSPECIFIED: Chronic | Status: ACTIVE | Noted: 2017-08-28

## 2019-09-22 PROBLEM — E55.9 VITAMIN D DEFICIENCY, UNSPECIFIED: Chronic | Status: ACTIVE | Noted: 2017-08-28

## 2019-09-22 LAB
ALBUMIN SERPL ELPH-MCNC: 4.9 G/DL — SIGNIFICANT CHANGE UP (ref 3.3–5)
ALP SERPL-CCNC: 112 U/L — SIGNIFICANT CHANGE UP (ref 40–120)
ALT FLD-CCNC: 30 U/L — SIGNIFICANT CHANGE UP (ref 10–45)
ANION GAP SERPL CALC-SCNC: 16 MMOL/L — SIGNIFICANT CHANGE UP (ref 5–17)
APPEARANCE UR: ABNORMAL
APTT BLD: 29.8 SEC — SIGNIFICANT CHANGE UP (ref 27.5–36.3)
AST SERPL-CCNC: 23 U/L — SIGNIFICANT CHANGE UP (ref 10–40)
BACTERIA # UR AUTO: NEGATIVE — SIGNIFICANT CHANGE UP
BASOPHILS # BLD AUTO: 0 K/UL — SIGNIFICANT CHANGE UP (ref 0–0.2)
BASOPHILS NFR BLD AUTO: 0.1 % — SIGNIFICANT CHANGE UP (ref 0–2)
BILIRUB SERPL-MCNC: 0.9 MG/DL — SIGNIFICANT CHANGE UP (ref 0.2–1.2)
BILIRUB UR-MCNC: NEGATIVE — SIGNIFICANT CHANGE UP
BUN SERPL-MCNC: 20 MG/DL — SIGNIFICANT CHANGE UP (ref 7–23)
CALCIUM SERPL-MCNC: 9.7 MG/DL — SIGNIFICANT CHANGE UP (ref 8.4–10.5)
CHLORIDE SERPL-SCNC: 95 MMOL/L — LOW (ref 96–108)
CK MB BLD-MCNC: 1 % — SIGNIFICANT CHANGE UP (ref 0–3.5)
CK MB BLD-MCNC: 1.1 % — SIGNIFICANT CHANGE UP (ref 0–3.5)
CK MB CFR SERPL CALC: 1.1 NG/ML — SIGNIFICANT CHANGE UP (ref 0–6.7)
CK MB CFR SERPL CALC: 1.3 NG/ML — SIGNIFICANT CHANGE UP (ref 0–6.7)
CK SERPL-CCNC: 110 U/L — SIGNIFICANT CHANGE UP (ref 30–200)
CK SERPL-CCNC: 123 U/L — SIGNIFICANT CHANGE UP (ref 30–200)
CO2 SERPL-SCNC: 25 MMOL/L — SIGNIFICANT CHANGE UP (ref 22–31)
COLOR SPEC: YELLOW — SIGNIFICANT CHANGE UP
CREAT SERPL-MCNC: 0.89 MG/DL — SIGNIFICANT CHANGE UP (ref 0.5–1.3)
DIFF PNL FLD: ABNORMAL
EOSINOPHIL # BLD AUTO: 0 K/UL — SIGNIFICANT CHANGE UP (ref 0–0.5)
EOSINOPHIL NFR BLD AUTO: 0.5 % — SIGNIFICANT CHANGE UP (ref 0–6)
EPI CELLS # UR: 1 /HPF — SIGNIFICANT CHANGE UP
ERYTHROCYTE [SEDIMENTATION RATE] IN BLOOD: 16 MM/HR — SIGNIFICANT CHANGE UP (ref 0–20)
GAS PNL BLDV: SIGNIFICANT CHANGE UP
GLUCOSE SERPL-MCNC: 113 MG/DL — HIGH (ref 70–99)
GLUCOSE UR QL: NEGATIVE — SIGNIFICANT CHANGE UP
HBA1C BLD-MCNC: 5.7 % — HIGH (ref 4–5.6)
HCT VFR BLD CALC: 47.7 % — SIGNIFICANT CHANGE UP (ref 39–50)
HGB BLD-MCNC: 15.9 G/DL — SIGNIFICANT CHANGE UP (ref 13–17)
HYALINE CASTS # UR AUTO: 14 /LPF — HIGH (ref 0–2)
INR BLD: 1.12 RATIO — SIGNIFICANT CHANGE UP (ref 0.88–1.16)
KETONES UR-MCNC: ABNORMAL
LEUKOCYTE ESTERASE UR-ACNC: NEGATIVE — SIGNIFICANT CHANGE UP
LIDOCAIN IGE QN: 18 U/L — SIGNIFICANT CHANGE UP (ref 7–60)
LYMPHOCYTES # BLD AUTO: 1 K/UL — SIGNIFICANT CHANGE UP (ref 1–3.3)
LYMPHOCYTES # BLD AUTO: 11.6 % — LOW (ref 13–44)
MAGNESIUM SERPL-MCNC: 2.2 MG/DL — SIGNIFICANT CHANGE UP (ref 1.6–2.6)
MCHC RBC-ENTMCNC: 29.4 PG — SIGNIFICANT CHANGE UP (ref 27–34)
MCHC RBC-ENTMCNC: 33.5 GM/DL — SIGNIFICANT CHANGE UP (ref 32–36)
MCV RBC AUTO: 87.7 FL — SIGNIFICANT CHANGE UP (ref 80–100)
MONOCYTES # BLD AUTO: 0.8 K/UL — SIGNIFICANT CHANGE UP (ref 0–0.9)
MONOCYTES NFR BLD AUTO: 8.8 % — SIGNIFICANT CHANGE UP (ref 2–14)
NEUTROPHILS # BLD AUTO: 6.8 K/UL — SIGNIFICANT CHANGE UP (ref 1.8–7.4)
NEUTROPHILS NFR BLD AUTO: 79 % — HIGH (ref 43–77)
NITRITE UR-MCNC: NEGATIVE — SIGNIFICANT CHANGE UP
NT-PROBNP SERPL-SCNC: 73 PG/ML — SIGNIFICANT CHANGE UP (ref 0–300)
PH UR: 6 — SIGNIFICANT CHANGE UP (ref 5–8)
PHOSPHATE SERPL-MCNC: 3.8 MG/DL — SIGNIFICANT CHANGE UP (ref 2.5–4.5)
PLATELET # BLD AUTO: 287 K/UL — SIGNIFICANT CHANGE UP (ref 150–400)
POTASSIUM SERPL-MCNC: 4.1 MMOL/L — SIGNIFICANT CHANGE UP (ref 3.5–5.3)
POTASSIUM SERPL-SCNC: 4.1 MMOL/L — SIGNIFICANT CHANGE UP (ref 3.5–5.3)
PROT SERPL-MCNC: 8.6 G/DL — HIGH (ref 6–8.3)
PROT UR-MCNC: ABNORMAL
PROTHROM AB SERPL-ACNC: 12.9 SEC — SIGNIFICANT CHANGE UP (ref 10–12.9)
RAPID RVP RESULT: SIGNIFICANT CHANGE UP
RBC # BLD: 5.43 M/UL — SIGNIFICANT CHANGE UP (ref 4.2–5.8)
RBC # FLD: 12.7 % — SIGNIFICANT CHANGE UP (ref 10.3–14.5)
RBC CASTS # UR COMP ASSIST: 7 /HPF — HIGH (ref 0–4)
SODIUM SERPL-SCNC: 136 MMOL/L — SIGNIFICANT CHANGE UP (ref 135–145)
SP GR SPEC: 1.03 — HIGH (ref 1.01–1.02)
TROPONIN T, HIGH SENSITIVITY RESULT: <6 NG/L — SIGNIFICANT CHANGE UP (ref 0–51)
TROPONIN T, HIGH SENSITIVITY RESULT: <6 NG/L — SIGNIFICANT CHANGE UP (ref 0–51)
UROBILINOGEN FLD QL: NEGATIVE — SIGNIFICANT CHANGE UP
WBC # BLD: 8.6 K/UL — SIGNIFICANT CHANGE UP (ref 3.8–10.5)
WBC # FLD AUTO: 8.6 K/UL — SIGNIFICANT CHANGE UP (ref 3.8–10.5)
WBC UR QL: 3 /HPF — SIGNIFICANT CHANGE UP (ref 0–5)

## 2019-09-22 PROCEDURE — 71046 X-RAY EXAM CHEST 2 VIEWS: CPT | Mod: 26

## 2019-09-22 PROCEDURE — 99285 EMERGENCY DEPT VISIT HI MDM: CPT

## 2019-09-22 PROCEDURE — 99222 1ST HOSP IP/OBS MODERATE 55: CPT

## 2019-09-22 PROCEDURE — 93010 ELECTROCARDIOGRAM REPORT: CPT

## 2019-09-22 RX ORDER — METOPROLOL TARTRATE 50 MG
1 TABLET ORAL
Qty: 0 | Refills: 0 | DISCHARGE

## 2019-09-22 RX ORDER — LISINOPRIL 2.5 MG/1
5 TABLET ORAL DAILY
Refills: 0 | Status: DISCONTINUED | OUTPATIENT
Start: 2019-09-22 | End: 2019-09-23

## 2019-09-22 RX ORDER — INFLUENZA VIRUS VACCINE 15; 15; 15; 15 UG/.5ML; UG/.5ML; UG/.5ML; UG/.5ML
0.5 SUSPENSION INTRAMUSCULAR ONCE
Refills: 0 | Status: DISCONTINUED | OUTPATIENT
Start: 2019-09-22 | End: 2019-09-23

## 2019-09-22 RX ORDER — SODIUM CHLORIDE 9 MG/ML
1000 INJECTION INTRAMUSCULAR; INTRAVENOUS; SUBCUTANEOUS ONCE
Refills: 0 | Status: COMPLETED | OUTPATIENT
Start: 2019-09-22 | End: 2019-09-22

## 2019-09-22 RX ORDER — ASPIRIN/CALCIUM CARB/MAGNESIUM 324 MG
81 TABLET ORAL DAILY
Refills: 0 | Status: DISCONTINUED | OUTPATIENT
Start: 2019-09-22 | End: 2019-09-23

## 2019-09-22 RX ORDER — ATORVASTATIN CALCIUM 80 MG/1
1 TABLET, FILM COATED ORAL
Qty: 0 | Refills: 0 | DISCHARGE

## 2019-09-22 RX ORDER — CLOPIDOGREL BISULFATE 75 MG/1
1 TABLET, FILM COATED ORAL
Qty: 90 | Refills: 3

## 2019-09-22 RX ORDER — LISINOPRIL 2.5 MG/1
1 TABLET ORAL
Qty: 30 | Refills: 2

## 2019-09-22 RX ORDER — METOPROLOL TARTRATE 50 MG
25 TABLET ORAL
Refills: 0 | Status: DISCONTINUED | OUTPATIENT
Start: 2019-09-22 | End: 2019-09-23

## 2019-09-22 RX ORDER — CLOPIDOGREL BISULFATE 75 MG/1
75 TABLET, FILM COATED ORAL DAILY
Refills: 0 | Status: DISCONTINUED | OUTPATIENT
Start: 2019-09-22 | End: 2019-09-23

## 2019-09-22 RX ORDER — ENOXAPARIN SODIUM 100 MG/ML
100 INJECTION SUBCUTANEOUS ONCE
Refills: 0 | Status: COMPLETED | OUTPATIENT
Start: 2019-09-22 | End: 2019-09-22

## 2019-09-22 RX ORDER — ACETAMINOPHEN 500 MG
650 TABLET ORAL ONCE
Refills: 0 | Status: COMPLETED | OUTPATIENT
Start: 2019-09-22 | End: 2019-09-22

## 2019-09-22 RX ORDER — ONDANSETRON 8 MG/1
4 TABLET, FILM COATED ORAL ONCE
Refills: 0 | Status: COMPLETED | OUTPATIENT
Start: 2019-09-22 | End: 2019-09-22

## 2019-09-22 RX ORDER — FLUOXETINE HCL 10 MG
1 CAPSULE ORAL
Qty: 0 | Refills: 0 | DISCHARGE

## 2019-09-22 RX ORDER — ERGOCALCIFEROL 1.25 MG/1
1 CAPSULE ORAL
Qty: 0 | Refills: 0 | DISCHARGE

## 2019-09-22 RX ORDER — ATORVASTATIN CALCIUM 80 MG/1
40 TABLET, FILM COATED ORAL AT BEDTIME
Refills: 0 | Status: DISCONTINUED | OUTPATIENT
Start: 2019-09-22 | End: 2019-09-23

## 2019-09-22 RX ADMIN — SODIUM CHLORIDE 1000 MILLILITER(S): 9 INJECTION INTRAMUSCULAR; INTRAVENOUS; SUBCUTANEOUS at 12:20

## 2019-09-22 RX ADMIN — ENOXAPARIN SODIUM 100 MILLIGRAM(S): 100 INJECTION SUBCUTANEOUS at 18:25

## 2019-09-22 RX ADMIN — Medication 25 MILLIGRAM(S): at 17:03

## 2019-09-22 RX ADMIN — LISINOPRIL 5 MILLIGRAM(S): 2.5 TABLET ORAL at 17:03

## 2019-09-22 RX ADMIN — CLOPIDOGREL BISULFATE 75 MILLIGRAM(S): 75 TABLET, FILM COATED ORAL at 17:03

## 2019-09-22 RX ADMIN — Medication 81 MILLIGRAM(S): at 17:03

## 2019-09-22 RX ADMIN — ATORVASTATIN CALCIUM 40 MILLIGRAM(S): 80 TABLET, FILM COATED ORAL at 21:35

## 2019-09-22 RX ADMIN — ONDANSETRON 4 MILLIGRAM(S): 8 TABLET, FILM COATED ORAL at 18:25

## 2019-09-22 RX ADMIN — Medication 650 MILLIGRAM(S): at 15:10

## 2019-09-22 NOTE — H&P ADULT - NSICDXPASTSURGICALHX_GEN_ALL_CORE_FT
PAST SURGICAL HISTORY:  H/O rotator cuff surgery right  2007    H/O umbilical hernia repair     Stented coronary artery

## 2019-09-22 NOTE — ED ADULT NURSE NOTE - OBJECTIVE STATEMENT
49 y.o. Male presents to the ED c/o chest pain since yesterday.  Pt also c/o nausea and diarrhea x3 days - went to urgent care today and was told to come to ED d/t history of 1 stent. As per patient, he recently came back from [place] yesterday. Pt noticed a tightness in middle of chest radiating to both shoulders neck. Pt states he was lifting luggages and his kids during the trip. A&Ox3. Ambulatory. Pt denies taking his BP medication today. Pt states taking 81mg aspirin x3 today. Pt had zofran ODT at urgent care - feels improvement. Pt is in no current distress. Comfort and safety provided. Will continue to monitor. Awaiting ED MD consult.

## 2019-09-22 NOTE — ED PROVIDER NOTE - ATTENDING CONTRIBUTION TO CARE
cp sim to when had nstemi  well appearing, rrr  dw his card who rec for admit. pleuritic, concern for pe / lovenox at cards request and they will do ctc/cta. dissection unlikely, ro acs.

## 2019-09-22 NOTE — ED PROVIDER NOTE - PMH
CAD (coronary artery disease)  s/p stent LPL2 7/2018  HTN (hypertension)    Hyperlipidemia    NSTEMI (non-ST elevated myocardial infarction)    Obesity (BMI 30-39.9)    Vitamin D deficiency

## 2019-09-22 NOTE — CONSULT NOTE ADULT - SUBJECTIVE AND OBJECTIVE BOX
HPI:  CHIEF COMPLAINT:Patient is a 49y old  Male who presents with a chief complaint of chest pain.    HPI:  48 y/o male PMHx HTN . , HLD, CAD s/p 1 MAKAYLA to LPL2 in 7/2018 after found to have NSTEMI on ASA and Plavix,   EF 60%     directed to the ED by Urgent Care for constant sharp sternal chest pain x2 days.    Patient reported the chest pain radiates to the left side of his neck, worse with deep inspiration, and is "similar pain to when the stent was placed". Patient reported he had a normal stress test 7/2019. Took ASA 243mg prior to arrival. Patient also reported nausea and 7 episodes daily of watery diarrhea for the last two days. Patient wife has similar symptoms and feels symptoms are likely due to food poisoning as he returned from flight from [place] yesterday. Patient denied MARTINEZ, peripheral edema, palpitations, HA, vomiting, fever chills, cough, smoking, fam h/o CAD, back pain, numbness, paresthesias, abdominal pain, dysuria, antibiotic use   last stress test in july         PAST MEDICAL & SURGICAL HISTORY:  HTN (hypertension)  NSTEMI (non-ST elevated myocardial infarction)  CAD (coronary artery disease): s/p stent LPL2 7/2018  Obesity (BMI 30-39.9)  Vitamin D deficiency  Hyperlipidemia  Stented coronary artery  H/O umbilical hernia repair  H/O rotator cuff surgery: right  2007      MEDICATIONS  (STANDING):  enoxaparin Injectable 100 milliGRAM(s) SubCutaneous once    MEDICATIONS  (PRN):      FAMILY HISTORY:  No pertinent family history in first degree relatives      SOCIAL HISTORY:    [ ] Non-smoker  [ ] Smoker  [ ] Alcohol    Allergies    No Known Allergies    Intolerances    	    REVIEW OF SYSTEMS:  CONSTITUTIONAL: No fever, weight loss, or fatigue  EYES: No eye pain, visual disturbances, or discharge  ENT:  No difficulty hearing, tinnitus, vertigo; No sinus or throat pain  NECK: No pain or stiffness  RESPIRATORY: No cough, wheezing, chills or hemoptysis; + Shortness of Breath  CARDIOVASCULAR: + chest pain,no   palpitations, passing out, dizziness, or leg swelling  GASTROINTESTINAL: No abdominal or epigastric pain. No nausea, vomiting, or hematemesis; No diarrhea or constipation. No melena or hematochezia.  GENITOURINARY: No dysuria, frequency, hematuria, or incontinence  NEUROLOGICAL: No headaches, memory loss, loss of strength, numbness, or tremors  SKIN: No itching, burning, rashes, or lesions   LYMPH Nodes: No enlarged glands  ENDOCRINE: No heat or cold intolerance; No hair loss  MUSCULOSKELETAL: No joint pain or swelling; No muscle, back, or extremity pain  PSYCHIATRIC: No depression, anxiety, mood swings, or difficulty sleeping  HEME/LYMPH: No easy bruising, or bleeding gums  ALLERGY AND IMMUNOLOGIC: No hives or eczema	    [ ] All others negative	  [ ] Unable to obtain    PHYSICAL EXAM:  T(C): 37.2 (09-22-19 @ 12:15), Max: 37.2 (09-22-19 @ 12:15)  HR: 81 (09-22-19 @ 12:15) (81 - 86)  BP: 171/96 (09-22-19 @ 12:15) (149/92 - 171/96)  RR: 18 (09-22-19 @ 12:15) (18 - 18)  SpO2: 97% (09-22-19 @ 12:15) (97% - 98%)  Wt(kg): --  I&O's Summary      Appearance: Normal	  HEENT:   Normal oral mucosa, PERRL, EOMI	  Lymphatic: No lymphadenopathy  Cardiovascular: Normal S1 S2, No JVD, + murmurs, No edema  Respiratory: Lungs clear to auscultation	  Psychiatry: A & O x 3, Mood & affect appropriate  Gastrointestinal:  Soft, Non-tender, + BS	  Skin: No rashes, No ecchymoses, No cyanosis	  Neurologic: Non-focal  Extremities: Normal range of motion, No clubbing, cyanosis or edema  Vascular: Peripheral pulses palpable 2+ bilaterally    TELEMETRY: 	    ECG:  	  RADIOLOGY:  OTHER: 	  	  LABS:	 	    CARDIAC MARKERS:  CARDIAC MARKERS ( 22 Sep 2019 12:27 )  x     / x     / 123 U/L / x     / 1.3 ng/mL                              15.9   8.6   )-----------( 287      ( 22 Sep 2019 12:28 )             47.7     09-22    136  |  95<L>  |  20  ----------------------------<  113<H>  4.1   |  25  |  0.89    Ca    9.7      22 Sep 2019 12:27  Phos  3.8     09-22  Mg     2.2     09-22    TPro  8.6<H>  /  Alb  4.9  /  TBili  0.9  /  DBili  x   /  AST  23  /  ALT  30  /  AlkPhos  112  09-22    proBNP: Serum Pro-Brain Natriuretic Peptide: 73 pg/mL (09-22 @ 12:27)    Lipid Profile:   HgA1c:   TSH:   PT/INR - ( 22 Sep 2019 12:27 )   PT: 12.9 sec;   INR: 1.12 ratio         PTT - ( 22 Sep 2019 12:27 )  PTT:29.8 sec    PREVIOUS DIAGNOSTIC TESTING:    < from: 12 Lead ECG (07.11.18 @ 19:49) >  Diagnosis Line NORMAL SINUS RHYTHM  INFERIOR INFARCT , AGE UNDETERMINED  ABNORMAL ECG    < from: Cardiac Cath Lab - Adult (07.11.18 @ 18:21) >  CORONARY VESSELS: The coronary circulation is left dominant.  LM:   --  LM: Normal.  LAD:   --  LAD: Angiography showed minor luminal irregularities with no  flow limiting lesions.  CX:   --  LPL2: There was a 70 % stenosis.  RCA:   --  RCA: Angiography showed minor luminal irregularities with no  flow limiting lesions.  COMPLICATIONS: There were no complications.  DIAGNOSTIC IMPRESSIONS: There is significant singlevessel coronary artery  disease. Successful PCI of LPL2 using a drug eluting stent was performed.  DIAGNOSTIC RECOMMENDATIONS: Add aspirin and plavix. Patient management  should include aggressive risk factor modification.  < from: Xray Chest 2 Views PA/Lat (07.11.18 @ 07:42) >  Impression: No radiographic evidence for acute cardiopulmonary disease. (22 Sep 2019 14:10)    Intolerances        CAPILLARY BLOOD GLUCOSE        I&O's Summary               47.7     09-22    136  |  95<L>  |  20  ----------------------------<  113<H>  4.1   |  25  |  0.89    Ca    9.7      22 Sep 2019 12:27  Phos  3.8     09-22  Mg     2.2     09-22    TPro  8.6<H>  /  Alb  4.9  /  TBili  0.9  /  DBili  x   /  AST  23  /  ALT  30  /  AlkPhos  112  09-22    PT/INR - ( 22 Sep 2019 12:27 )   PT: 12.9 sec;   INR: 1.12 ratio         PTT - ( 22 Sep 2019 12:27 )  PTT:29.8 sec  CARDIAC MARKERS ( 22 Sep 2019 12:27 )  x     / x     / 123 U/L / x     / 1.3 ng/mL            09-22 @ 12:26  4.0  7.39              Consultant(s) Notes Reviewed:      Care Discussed with Consultants/Other Providers:  Plan:

## 2019-09-22 NOTE — ED PROVIDER NOTE - OBJECTIVE STATEMENT
48 y/o male PMHx HTN on metop, HLD, CAD s/p 1 MAKAYLA to LPL2 by Dr. Billy Rodriguez in 7/2018 after found to have NSTEMI on ASA and Plavix directed to the ED by Urgent Care for constant sharp sternal chest pain x2 days. Patient reported the chest pain radiates to the left side of his neck, worse with deep inspiration, and is "similar pain to when the stent was placed". Patient reported he had a normal stress test 7/2019. Took ASA 243mg prior to arrival. Patient also reported nausea and 7 episodes daily of watery diarrhea for the last two days. Patient wife has similar symptoms and feels symptoms are likely due to food poisoning as he returned from flight from [place] yesterday. Patient denied MARTINEZ, peripheral edema, palpitations, HA, vomiting, fever chills, cough, smoking, fam h/o CAD, back pain, numbness, paresthesias, abdominal pain, dysuria, antibiotic use     Cardiologist located in [place]     Cardiac Cath Lab - Adult (07.11.18 @ 18:21) >  CORONARY VESSELS: The coronary circulation is left dominant. LM:   --  LM: Normal. LAD:   --  LAD: Angiography showed minor luminal irregularities with no  flow limiting lesions. CX:   --  LPL2: There was a 70 % stenosis. RCA:   --  RCA: Angiography showed minor luminal irregularities with no flow limiting lesions.  DIAGNOSTIC IMPRESSIONS: There is significant singlevessel coronary artery  disease. Successful PCI of LPL2 using a drug eluting stent was performed. 50 y/o male PMHx HTN on metop, HLD, CAD s/p 1 MAKAYLA to LPL2 by Dr. Billy Rodriguez in 7/2018 after found to have NSTEMI on ASA and Plavix, EF 60% directed to the ED by Urgent Care for constant sharp sternal chest pain x2 days. Patient reported the chest pain radiates to the left side of his neck, worse with deep inspiration, and is "similar pain to when the stent was placed". Patient reported he had a normal stress test 7/2019. Took ASA 243mg prior to arrival. Patient also reported nausea and 7 episodes daily of watery diarrhea for the last two days. Patient wife has similar symptoms and feels symptoms are likely due to food poisoning as he returned from flight from [place] yesterday. Patient denied MARTINEZ, peripheral edema, palpitations, HA, vomiting, fever chills, cough, smoking, fam h/o CAD, back pain, numbness, paresthesias, abdominal pain, dysuria, antibiotic use     Cardiologist located in [place]     Cardiac Cath Lab - Adult (07.11.18 @ 18:21) >  CORONARY VESSELS: The coronary circulation is left dominant. LM:   --  LM: Normal. LAD:   --  LAD: Angiography showed minor luminal irregularities with no  flow limiting lesions. CX:   --  LPL2: There was a 70 % stenosis. RCA:   --  RCA: Angiography showed minor luminal irregularities with no flow limiting lesions.  DIAGNOSTIC IMPRESSIONS: There is significant single vessel coronary artery  disease. Successful PCI of LPL2 using a drug eluting stent was performed.

## 2019-09-22 NOTE — H&P ADULT - ASSESSMENT
50 y/o male PMHx HTN on metop, HLD, CAD s/p 1 MAKAYLA to LPL2 by Dr. Billy Rodriguez in 7/2018 after found to have NSTEMI on ASA and Plavix, EF 60% directed to the ED by Urgent Care for constant sharp sternal chest pain x2 days. Patient reported the chest pain radiates to the left side of his neck, worse with deep inspiration, and is "similar pain to when the stent was placed". Patient reported he had a normal stress test 7/2019. Took ASA 243mg prior to arrival. Patient also reported nausea and 7 episodes daily of watery diarrhea for the last two days. Patient wife has similar symptoms and feels symptoms are likely due to food poisoning as he returned from flight from [place] yesterday. Patient denied MARTINEZ, peripheral edema, palpitations, HA, vomiting, fever chills, cough, smoking, fam h/o CAD, back pain, numbness, paresthesias, abdominal pain, dysuria, antibiotic use   last stress test in july and pleuritic chest pain and diarrhea, but he is been travelling recently  pt wife has similar complain.  chest pain doubt cardiac r/o PE/pericarditis viral  tele  cardiac enzymes  esr  tsh  continue all cardiac meds  plan for cardiac ct and PE protocol 48 y/o male PMHx HTN on metop, HLD, CAD s/p 1 MAKAYLA to LPL2  in 7/2018 after found to have NSTEMI on ASA and Plavix, EF 60% directed to the ED by Urgent Care for constant sharp sternal chest pain x2 days. Patient reported the chest pain radiates to the left side of his neck, worse with deep inspiration, and is "similar pain to when the stent was placed". Patient reported he had a normal stress test 7/2019. Took ASA 243mg prior to arrival. Patient also reported nausea and 7 episodes daily of watery diarrhea for the last two days. Patient wife has similar symptoms and feels symptoms are likely due to food poisoning as he returned from flight from [place] yesterday. Patient denied MARTINEZ, peripheral edema, palpitations, HA, vomiting, fever chills, cough, smoking, fam h/o CAD, back pain, numbness, paresthesias, abdominal pain, dysuria, antibiotic use   last stress test in july and pleuritic chest pain and diarrhea, but he is been travelling recently  pt wife has similar complain.  chest pain doubt cardiac r/o PE/pericarditis viral  tele  cardiac enzymes  esr  tsh  continue all cardiac meds  plan for cardiac ct and PE protocol

## 2019-09-22 NOTE — H&P ADULT - NSICDXPASTMEDICALHX_GEN_ALL_CORE_FT
PAST MEDICAL HISTORY:  CAD (coronary artery disease) s/p stent LPL2 7/2018    HTN (hypertension)     Hyperlipidemia     NSTEMI (non-ST elevated myocardial infarction)     Obesity (BMI 30-39.9)     Vitamin D deficiency

## 2019-09-22 NOTE — H&P ADULT - HISTORY OF PRESENT ILLNESS
CHIEF COMPLAINT:Patient is a 49y old  Male who presents with a chief complaint of chest pain.    HPI:        PAST MEDICAL & SURGICAL HISTORY:  HTN (hypertension)  NSTEMI (non-ST elevated myocardial infarction)  CAD (coronary artery disease): s/p stent LPL2 7/2018  Obesity (BMI 30-39.9)  Vitamin D deficiency  Hyperlipidemia  Stented coronary artery  H/O umbilical hernia repair  H/O rotator cuff surgery: right  2007      MEDICATIONS  (STANDING):  enoxaparin Injectable 100 milliGRAM(s) SubCutaneous once    MEDICATIONS  (PRN):      FAMILY HISTORY:  No pertinent family history in first degree relatives      SOCIAL HISTORY:    [ ] Non-smoker  [ ] Smoker  [ ] Alcohol    Allergies    No Known Allergies    Intolerances    	    REVIEW OF SYSTEMS:  CONSTITUTIONAL: No fever, weight loss, or fatigue  EYES: No eye pain, visual disturbances, or discharge  ENT:  No difficulty hearing, tinnitus, vertigo; No sinus or throat pain  NECK: No pain or stiffness  RESPIRATORY: No cough, wheezing, chills or hemoptysis; + Shortness of Breath  CARDIOVASCULAR: + chest pain,no   palpitations, passing out, dizziness, or leg swelling  GASTROINTESTINAL: No abdominal or epigastric pain. No nausea, vomiting, or hematemesis; No diarrhea or constipation. No melena or hematochezia.  GENITOURINARY: No dysuria, frequency, hematuria, or incontinence  NEUROLOGICAL: No headaches, memory loss, loss of strength, numbness, or tremors  SKIN: No itching, burning, rashes, or lesions   LYMPH Nodes: No enlarged glands  ENDOCRINE: No heat or cold intolerance; No hair loss  MUSCULOSKELETAL: No joint pain or swelling; No muscle, back, or extremity pain  PSYCHIATRIC: No depression, anxiety, mood swings, or difficulty sleeping  HEME/LYMPH: No easy bruising, or bleeding gums  ALLERGY AND IMMUNOLOGIC: No hives or eczema	    [ ] All others negative	  [ ] Unable to obtain    PHYSICAL EXAM:  T(C): 37.2 (09-22-19 @ 12:15), Max: 37.2 (09-22-19 @ 12:15)  HR: 81 (09-22-19 @ 12:15) (81 - 86)  BP: 171/96 (09-22-19 @ 12:15) (149/92 - 171/96)  RR: 18 (09-22-19 @ 12:15) (18 - 18)  SpO2: 97% (09-22-19 @ 12:15) (97% - 98%)  Wt(kg): --  I&O's Summary      Appearance: Normal	  HEENT:   Normal oral mucosa, PERRL, EOMI	  Lymphatic: No lymphadenopathy  Cardiovascular: Normal S1 S2, No JVD, + murmurs, No edema  Respiratory: Lungs clear to auscultation	  Psychiatry: A & O x 3, Mood & affect appropriate  Gastrointestinal:  Soft, Non-tender, + BS	  Skin: No rashes, No ecchymoses, No cyanosis	  Neurologic: Non-focal  Extremities: Normal range of motion, No clubbing, cyanosis or edema  Vascular: Peripheral pulses palpable 2+ bilaterally    TELEMETRY: 	    ECG:  	  RADIOLOGY:  OTHER: 	  	  LABS:	 	    CARDIAC MARKERS:  CARDIAC MARKERS ( 22 Sep 2019 12:27 )  x     / x     / 123 U/L / x     / 1.3 ng/mL                              15.9   8.6   )-----------( 287      ( 22 Sep 2019 12:28 )             47.7     09-22    136  |  95<L>  |  20  ----------------------------<  113<H>  4.1   |  25  |  0.89    Ca    9.7      22 Sep 2019 12:27  Phos  3.8     09-22  Mg     2.2     09-22    TPro  8.6<H>  /  Alb  4.9  /  TBili  0.9  /  DBili  x   /  AST  23  /  ALT  30  /  AlkPhos  112  09-22    proBNP: Serum Pro-Brain Natriuretic Peptide: 73 pg/mL (09-22 @ 12:27)    Lipid Profile:   HgA1c:   TSH:   PT/INR - ( 22 Sep 2019 12:27 )   PT: 12.9 sec;   INR: 1.12 ratio         PTT - ( 22 Sep 2019 12:27 )  PTT:29.8 sec    PREVIOUS DIAGNOSTIC TESTING:    < from: 12 Lead ECG (07.11.18 @ 19:49) >  Diagnosis Line NORMAL SINUS RHYTHM  INFERIOR INFARCT , AGE UNDETERMINED  ABNORMAL ECG    < from: Cardiac Cath Lab - Adult (07.11.18 @ 18:21) >  CORONARY VESSELS: The coronary circulation is left dominant.  LM:   --  LM: Normal.  LAD:   --  LAD: Angiography showed minor luminal irregularities with no  flow limiting lesions.  CX:   --  LPL2: There was a 70 % stenosis.  RCA:   --  RCA: Angiography showed minor luminal irregularities with no  flow limiting lesions.  COMPLICATIONS: There were no complications.  DIAGNOSTIC IMPRESSIONS: There is significant singlevessel coronary artery  disease. Successful PCI of LPL2 using a drug eluting stent was performed.  DIAGNOSTIC RECOMMENDATIONS: Add aspirin and plavix. Patient management  should include aggressive risk factor modification.  < from: Xray Chest 2 Views PA/Lat (07.11.18 @ 07:42) >  Impression: No radiographic evidence for acute cardiopulmonary disease. CHIEF COMPLAINT:Patient is a 49y old  Male who presents with a chief complaint of chest pain.    HPI:  50 y/o male PMHx HTN on metop, HLD, CAD s/p 1 MAKAYLA to LPL2 in 7/2018 after found to have NSTEMI on ASA and Plavix, EF 60% directed to the ED by Urgent Care for constant sharp sternal chest pain x2 days. Patient reported the chest pain radiates to the left side of his neck, worse with deep inspiration, and is "similar pain to when the stent was placed". Patient reported he had a normal stress test 7/2019. Took ASA 243mg prior to arrival. Patient also reported nausea and 7 episodes daily of watery diarrhea for the last two days. Patient wife has similar symptoms and feels symptoms are likely due to food poisoning as he returned from flight from [place] yesterday. Patient denied MARTINEZ, peripheral edema, palpitations, HA, vomiting, fever chills, cough, smoking, fam h/o CAD, back pain, numbness, paresthesias, abdominal pain, dysuria, antibiotic use   last stress test in july and pleuritic chest pain and diarrhea, but he is been travelling recently  pt wife has similar complain.  chest pain doubt cardiac r/o PE/pericarditis viral        PAST MEDICAL & SURGICAL HISTORY:  HTN (hypertension)  NSTEMI (non-ST elevated myocardial infarction)  CAD (coronary artery disease): s/p stent LPL2 7/2018  Obesity (BMI 30-39.9)  Vitamin D deficiency  Hyperlipidemia  Stented coronary artery  H/O umbilical hernia repair  H/O rotator cuff surgery: right  2007      MEDICATIONS  (STANDING):  enoxaparin Injectable 100 milliGRAM(s) SubCutaneous once    MEDICATIONS  (PRN):      FAMILY HISTORY:  No pertinent family history in first degree relatives      SOCIAL HISTORY:    [ ] Non-smoker  [ ] Smoker  [ ] Alcohol    Allergies    No Known Allergies    Intolerances    	    REVIEW OF SYSTEMS:  CONSTITUTIONAL: No fever, weight loss, or fatigue  EYES: No eye pain, visual disturbances, or discharge  ENT:  No difficulty hearing, tinnitus, vertigo; No sinus or throat pain  NECK: No pain or stiffness  RESPIRATORY: No cough, wheezing, chills or hemoptysis; + Shortness of Breath  CARDIOVASCULAR: + chest pain,no   palpitations, passing out, dizziness, or leg swelling  GASTROINTESTINAL: No abdominal or epigastric pain. No nausea, vomiting, or hematemesis; No diarrhea or constipation. No melena or hematochezia.  GENITOURINARY: No dysuria, frequency, hematuria, or incontinence  NEUROLOGICAL: No headaches, memory loss, loss of strength, numbness, or tremors  SKIN: No itching, burning, rashes, or lesions   LYMPH Nodes: No enlarged glands  ENDOCRINE: No heat or cold intolerance; No hair loss  MUSCULOSKELETAL: No joint pain or swelling; No muscle, back, or extremity pain  PSYCHIATRIC: No depression, anxiety, mood swings, or difficulty sleeping  HEME/LYMPH: No easy bruising, or bleeding gums  ALLERGY AND IMMUNOLOGIC: No hives or eczema	    [ ] All others negative	  [ ] Unable to obtain    PHYSICAL EXAM:  T(C): 37.2 (09-22-19 @ 12:15), Max: 37.2 (09-22-19 @ 12:15)  HR: 81 (09-22-19 @ 12:15) (81 - 86)  BP: 171/96 (09-22-19 @ 12:15) (149/92 - 171/96)  RR: 18 (09-22-19 @ 12:15) (18 - 18)  SpO2: 97% (09-22-19 @ 12:15) (97% - 98%)  Wt(kg): --  I&O's Summary      Appearance: Normal	  HEENT:   Normal oral mucosa, PERRL, EOMI	  Lymphatic: No lymphadenopathy  Cardiovascular: Normal S1 S2, No JVD, + murmurs, No edema  Respiratory: Lungs clear to auscultation	  Psychiatry: A & O x 3, Mood & affect appropriate  Gastrointestinal:  Soft, Non-tender, + BS	  Skin: No rashes, No ecchymoses, No cyanosis	  Neurologic: Non-focal  Extremities: Normal range of motion, No clubbing, cyanosis or edema  Vascular: Peripheral pulses palpable 2+ bilaterally    TELEMETRY: 	    ECG:  	  RADIOLOGY:  OTHER: 	  	  LABS:	 	    CARDIAC MARKERS:  CARDIAC MARKERS ( 22 Sep 2019 12:27 )  x     / x     / 123 U/L / x     / 1.3 ng/mL                              15.9   8.6   )-----------( 287      ( 22 Sep 2019 12:28 )             47.7     09-22    136  |  95<L>  |  20  ----------------------------<  113<H>  4.1   |  25  |  0.89    Ca    9.7      22 Sep 2019 12:27  Phos  3.8     09-22  Mg     2.2     09-22    TPro  8.6<H>  /  Alb  4.9  /  TBili  0.9  /  DBili  x   /  AST  23  /  ALT  30  /  AlkPhos  112  09-22    proBNP: Serum Pro-Brain Natriuretic Peptide: 73 pg/mL (09-22 @ 12:27)    Lipid Profile:   HgA1c:   TSH:   PT/INR - ( 22 Sep 2019 12:27 )   PT: 12.9 sec;   INR: 1.12 ratio         PTT - ( 22 Sep 2019 12:27 )  PTT:29.8 sec    PREVIOUS DIAGNOSTIC TESTING:    < from: 12 Lead ECG (07.11.18 @ 19:49) >  Diagnosis Line NORMAL SINUS RHYTHM  INFERIOR INFARCT , AGE UNDETERMINED  ABNORMAL ECG    < from: Cardiac Cath Lab - Adult (07.11.18 @ 18:21) >  CORONARY VESSELS: The coronary circulation is left dominant.  LM:   --  LM: Normal.  LAD:   --  LAD: Angiography showed minor luminal irregularities with no  flow limiting lesions.  CX:   --  LPL2: There was a 70 % stenosis.  RCA:   --  RCA: Angiography showed minor luminal irregularities with no  flow limiting lesions.  COMPLICATIONS: There were no complications.  DIAGNOSTIC IMPRESSIONS: There is significant singlevessel coronary artery  disease. Successful PCI of LPL2 using a drug eluting stent was performed.  DIAGNOSTIC RECOMMENDATIONS: Add aspirin and plavix. Patient management  should include aggressive risk factor modification.  < from: Xray Chest 2 Views PA/Lat (07.11.18 @ 07:42) >  Impression: No radiographic evidence for acute cardiopulmonary disease.

## 2019-09-22 NOTE — CONSULT NOTE ADULT - ASSESSMENT
50 y/o male      PMHx    HTN . , HLD,      CAD s/p 1 MAKAYLA to LPL2 in 7/2018 .  s/p   NSTEMI on ASA and Plavix,  with  EF 60%       sent to  ED by Urgent Care for constant sharp sternal chest pain x2 days.    Patient reported the chest pain radiates to the left side of his neck, worse with deep inspiration, and is "similar pain to when the stent was placed"     he had a normal stress test 7/2019.      7 episodes daily of watery diarrhea for the last two days./ also  spouse,   due to food poisoning ,  as he returned from flight from [place] yesterday.   asymptomatic  now   w/p per  card/ ct heart  cardiac meds/  bp meds  to be adjusted   no diarrhea  now/ afebrile  normal wbc

## 2019-09-23 ENCOUNTER — TRANSCRIPTION ENCOUNTER (OUTPATIENT)
Age: 49
End: 2019-09-23

## 2019-09-23 VITALS
TEMPERATURE: 99 F | HEART RATE: 67 BPM | DIASTOLIC BLOOD PRESSURE: 72 MMHG | RESPIRATION RATE: 18 BRPM | OXYGEN SATURATION: 97 % | SYSTOLIC BLOOD PRESSURE: 110 MMHG

## 2019-09-23 LAB
ALBUMIN SERPL ELPH-MCNC: 4.1 G/DL — SIGNIFICANT CHANGE UP (ref 3.3–5)
ALP SERPL-CCNC: 101 U/L — SIGNIFICANT CHANGE UP (ref 40–120)
ALT FLD-CCNC: 25 U/L — SIGNIFICANT CHANGE UP (ref 10–45)
ANION GAP SERPL CALC-SCNC: 11 MMOL/L — SIGNIFICANT CHANGE UP (ref 5–17)
AST SERPL-CCNC: 19 U/L — SIGNIFICANT CHANGE UP (ref 10–40)
BILIRUB SERPL-MCNC: 0.9 MG/DL — SIGNIFICANT CHANGE UP (ref 0.2–1.2)
BUN SERPL-MCNC: 15 MG/DL — SIGNIFICANT CHANGE UP (ref 7–23)
CALCIUM SERPL-MCNC: 9 MG/DL — SIGNIFICANT CHANGE UP (ref 8.4–10.5)
CHLORIDE SERPL-SCNC: 101 MMOL/L — SIGNIFICANT CHANGE UP (ref 96–108)
CHOLEST SERPL-MCNC: 102 MG/DL — SIGNIFICANT CHANGE UP (ref 10–199)
CO2 SERPL-SCNC: 23 MMOL/L — SIGNIFICANT CHANGE UP (ref 22–31)
CREAT SERPL-MCNC: 0.81 MG/DL — SIGNIFICANT CHANGE UP (ref 0.5–1.3)
GLUCOSE SERPL-MCNC: 106 MG/DL — HIGH (ref 70–99)
HCT VFR BLD CALC: 42.5 % — SIGNIFICANT CHANGE UP (ref 39–50)
HDLC SERPL-MCNC: 31 MG/DL — LOW
HGB BLD-MCNC: 14.2 G/DL — SIGNIFICANT CHANGE UP (ref 13–17)
LIPID PNL WITH DIRECT LDL SERPL: 50 MG/DL — SIGNIFICANT CHANGE UP
MCHC RBC-ENTMCNC: 28.6 PG — SIGNIFICANT CHANGE UP (ref 27–34)
MCHC RBC-ENTMCNC: 33.4 GM/DL — SIGNIFICANT CHANGE UP (ref 32–36)
MCV RBC AUTO: 85.7 FL — SIGNIFICANT CHANGE UP (ref 80–100)
PLATELET # BLD AUTO: 268 K/UL — SIGNIFICANT CHANGE UP (ref 150–400)
POTASSIUM SERPL-MCNC: 4.1 MMOL/L — SIGNIFICANT CHANGE UP (ref 3.5–5.3)
POTASSIUM SERPL-SCNC: 4.1 MMOL/L — SIGNIFICANT CHANGE UP (ref 3.5–5.3)
PROT SERPL-MCNC: 7.3 G/DL — SIGNIFICANT CHANGE UP (ref 6–8.3)
RBC # BLD: 4.96 M/UL — SIGNIFICANT CHANGE UP (ref 4.2–5.8)
RBC # FLD: 14.1 % — SIGNIFICANT CHANGE UP (ref 10.3–14.5)
SODIUM SERPL-SCNC: 135 MMOL/L — SIGNIFICANT CHANGE UP (ref 135–145)
TOTAL CHOLESTEROL/HDL RATIO MEASUREMENT: 3.3 RATIO — LOW (ref 3.4–9.6)
TRIGL SERPL-MCNC: 103 MG/DL — SIGNIFICANT CHANGE UP (ref 10–149)
TSH SERPL-MCNC: 3.16 UIU/ML — SIGNIFICANT CHANGE UP (ref 0.27–4.2)
WBC # BLD: 7.57 K/UL — SIGNIFICANT CHANGE UP (ref 3.8–10.5)
WBC # FLD AUTO: 7.57 K/UL — SIGNIFICANT CHANGE UP (ref 3.8–10.5)

## 2019-09-23 PROCEDURE — 75574 CT ANGIO HRT W/3D IMAGE: CPT | Mod: 26

## 2019-09-23 PROCEDURE — 71046 X-RAY EXAM CHEST 2 VIEWS: CPT

## 2019-09-23 PROCEDURE — 82550 ASSAY OF CK (CPK): CPT

## 2019-09-23 PROCEDURE — 83036 HEMOGLOBIN GLYCOSYLATED A1C: CPT

## 2019-09-23 PROCEDURE — 82330 ASSAY OF CALCIUM: CPT

## 2019-09-23 PROCEDURE — 83690 ASSAY OF LIPASE: CPT

## 2019-09-23 PROCEDURE — 80061 LIPID PANEL: CPT

## 2019-09-23 PROCEDURE — 83735 ASSAY OF MAGNESIUM: CPT

## 2019-09-23 PROCEDURE — 75574 CT ANGIO HRT W/3D IMAGE: CPT

## 2019-09-23 PROCEDURE — 84295 ASSAY OF SERUM SODIUM: CPT

## 2019-09-23 PROCEDURE — 85730 THROMBOPLASTIN TIME PARTIAL: CPT

## 2019-09-23 PROCEDURE — 84100 ASSAY OF PHOSPHORUS: CPT

## 2019-09-23 PROCEDURE — 85610 PROTHROMBIN TIME: CPT

## 2019-09-23 PROCEDURE — 85027 COMPLETE CBC AUTOMATED: CPT

## 2019-09-23 PROCEDURE — 81001 URINALYSIS AUTO W/SCOPE: CPT

## 2019-09-23 PROCEDURE — 83880 ASSAY OF NATRIURETIC PEPTIDE: CPT

## 2019-09-23 PROCEDURE — 85652 RBC SED RATE AUTOMATED: CPT

## 2019-09-23 PROCEDURE — 99285 EMERGENCY DEPT VISIT HI MDM: CPT

## 2019-09-23 PROCEDURE — 82553 CREATINE MB FRACTION: CPT

## 2019-09-23 PROCEDURE — 84443 ASSAY THYROID STIM HORMONE: CPT

## 2019-09-23 PROCEDURE — 82947 ASSAY GLUCOSE BLOOD QUANT: CPT

## 2019-09-23 PROCEDURE — 82435 ASSAY OF BLOOD CHLORIDE: CPT

## 2019-09-23 PROCEDURE — 87633 RESP VIRUS 12-25 TARGETS: CPT

## 2019-09-23 PROCEDURE — 85014 HEMATOCRIT: CPT

## 2019-09-23 PROCEDURE — 84484 ASSAY OF TROPONIN QUANT: CPT

## 2019-09-23 PROCEDURE — 84132 ASSAY OF SERUM POTASSIUM: CPT

## 2019-09-23 PROCEDURE — 82803 BLOOD GASES ANY COMBINATION: CPT

## 2019-09-23 PROCEDURE — 87798 DETECT AGENT NOS DNA AMP: CPT

## 2019-09-23 PROCEDURE — 87486 CHLMYD PNEUM DNA AMP PROBE: CPT

## 2019-09-23 PROCEDURE — 83605 ASSAY OF LACTIC ACID: CPT

## 2019-09-23 PROCEDURE — 87581 M.PNEUMON DNA AMP PROBE: CPT

## 2019-09-23 PROCEDURE — 93005 ELECTROCARDIOGRAM TRACING: CPT

## 2019-09-23 PROCEDURE — 80053 COMPREHEN METABOLIC PANEL: CPT

## 2019-09-23 RX ORDER — METOPROLOL TARTRATE 50 MG
50 TABLET ORAL ONCE
Refills: 0 | Status: COMPLETED | OUTPATIENT
Start: 2019-09-23 | End: 2019-09-23

## 2019-09-23 RX ORDER — METOPROLOL TARTRATE 50 MG
25 TABLET ORAL ONCE
Refills: 0 | Status: COMPLETED | OUTPATIENT
Start: 2019-09-23 | End: 2019-09-23

## 2019-09-23 RX ORDER — ACETAMINOPHEN 500 MG
650 TABLET ORAL ONCE
Refills: 0 | Status: COMPLETED | OUTPATIENT
Start: 2019-09-23 | End: 2019-09-23

## 2019-09-23 RX ORDER — ASPIRIN/CALCIUM CARB/MAGNESIUM 324 MG
1 TABLET ORAL
Qty: 0 | Refills: 0 | DISCHARGE
Start: 2019-09-23

## 2019-09-23 RX ADMIN — Medication 650 MILLIGRAM(S): at 14:05

## 2019-09-23 RX ADMIN — Medication 50 MILLIGRAM(S): at 12:43

## 2019-09-23 RX ADMIN — Medication 650 MILLIGRAM(S): at 12:43

## 2019-09-23 RX ADMIN — Medication 650 MILLIGRAM(S): at 03:35

## 2019-09-23 RX ADMIN — Medication 81 MILLIGRAM(S): at 11:27

## 2019-09-23 RX ADMIN — Medication 25 MILLIGRAM(S): at 05:20

## 2019-09-23 RX ADMIN — Medication 650 MILLIGRAM(S): at 03:05

## 2019-09-23 RX ADMIN — CLOPIDOGREL BISULFATE 75 MILLIGRAM(S): 75 TABLET, FILM COATED ORAL at 11:26

## 2019-09-23 RX ADMIN — LISINOPRIL 5 MILLIGRAM(S): 2.5 TABLET ORAL at 05:20

## 2019-09-23 RX ADMIN — Medication 25 MILLIGRAM(S): at 14:12

## 2019-09-23 NOTE — DISCHARGE NOTE PROVIDER - NSDCCPCAREPLAN_GEN_ALL_CORE_FT
PRINCIPAL DISCHARGE DIAGNOSIS  Diagnosis: Chest pain  Assessment and Plan of Treatment: S/p Coronary CT w/ minimal CAD   -Continue your current cardiac medications   -Outpatient follow up with cardiology in 2 weeks      SECONDARY DISCHARGE DIAGNOSES  Diagnosis: CAD (coronary artery disease), native coronary artery  Assessment and Plan of Treatment: Continue all cardiac medications as above

## 2019-09-23 NOTE — DISCHARGE NOTE PROVIDER - NSDCFUADDINST_GEN_ALL_CORE_FT
-Follow with your Primary care Physician in 1 week to review your hospital course  -Bring all discharge documents and prescriptions with you at the time of your appointments   -You may return to the Emergency department for any worsening or return of your symptoms

## 2019-09-23 NOTE — DISCHARGE NOTE NURSING/CASE MANAGEMENT/SOCIAL WORK - PATIENT PORTAL LINK FT
You can access the FollowMyHealth Patient Portal offered by [facility] by registering at the following website: [email or web address]. By joining [facility]’s FollowMyHealth portal, you will also be able to view your health information using other applications (apps) compatible with our system.

## 2019-09-23 NOTE — DISCHARGE NOTE PROVIDER - HOSPITAL COURSE
48 y/o male PMHx HTN on metop, HLD, CAD s/p 1 MAKAYLA to LPL2  in 7/2018 after found to have NSTEMI on ASA and Plavix, EF 60% directed to the ED by Urgent Care for constant sharp sternal chest pain x2 days. Patient reported the chest pain radiates to the left side of his neck, worse with deep inspiration, and is "similar pain to when the stent was placed". Patient reported he had a normal stress test 7/2019. Took ASA 243mg prior to arrival. Patient also reported nausea and 7 episodes daily of watery diarrhea for the last two days. Patient wife has similar symptoms and feels symptoms are likely due to food poisoning as he returned from flight from [place] yesterday.        Troponin negative. Rvp negative. CXR clear lungs. No events on Telemetry noted. Had Coronary CT revealed .  Coronary artery disease:    LM:  mild calcified plaque distally extending into the ostium of the LAD    LAD:  minimal (<30%) non-calcified and calcified plaque in the proximal,     mid and distal segments    LCX:   minimal (<30%) non-calcified plaque at the ostium; minimal (<30%)     non-calcified and calcified plaque in the proximal segment; indeterminate     assessment of stent patency in the OM side branch    RCA:  minimal (<30%) non-calcified and calcified plaque in the proximal     and mid segments    2.  Severe coronary artery calcium (Agatston score 406) as approximately     delineated above.    3. No pulmonary arterial emboli.        Findings discussed with Attending Physician Dr Leigh. Pt with clinical improvement. Deemed medically stable and cleared for discharge home today. F/u in 1 week with his Primary Care Physician and in 2 weeks Dr Leigh or his private cardiologist.

## 2019-09-23 NOTE — PROGRESS NOTE ADULT - SUBJECTIVE AND OBJECTIVE BOX
CARDIOLOGY     PROGRESS  NOTE   ________________________________________________    CHIEF COMPLAINT:Patient is a 49y old  Male who presents with a chief complaint of chest pain. (22 Sep 2019 14:20)  no complain.  	  REVIEW OF SYSTEMS:  CONSTITUTIONAL: No fever, weight loss, or fatigue  EYES: No eye pain, visual disturbances, or discharge  ENT:  No difficulty hearing, tinnitus, vertigo; No sinus or throat pain  NECK: No pain or stiffness  RESPIRATORY: No cough, wheezing, chills or hemoptysis; No Shortness of Breath  CARDIOVASCULAR: No chest pain, palpitations, passing out, dizziness, or leg swelling  GASTROINTESTINAL: No abdominal or epigastric pain. No nausea, vomiting, or hematemesis; No diarrhea or constipation. No melena or hematochezia.  GENITOURINARY: No dysuria, frequency, hematuria, or incontinence  NEUROLOGICAL: No headaches, memory loss, loss of strength, numbness, or tremors  SKIN: No itching, burning, rashes, or lesions   LYMPH Nodes: No enlarged glands  ENDOCRINE: No heat or cold intolerance; No hair loss  MUSCULOSKELETAL: No joint pain or swelling; No muscle, back, or extremity pain  PSYCHIATRIC: No depression, anxiety, mood swings, or difficulty sleeping  HEME/LYMPH: No easy bruising, or bleeding gums  ALLERGY AND IMMUNOLOGIC: No hives or eczema	    [ ] All others negative	  [ ] Unable to obtain    PHYSICAL EXAM:  T(C): 36.9 (09-23-19 @ 08:17), Max: 37.2 (09-22-19 @ 12:15)  HR: 73 (09-23-19 @ 08:17) (73 - 86)  BP: 126/85 (09-23-19 @ 08:17) (126/85 - 171/96)  RR: 18 (09-23-19 @ 08:17) (18 - 20)  SpO2: 97% (09-23-19 @ 08:17) (97% - 98%)  Wt(kg): --  I&O's Summary      Appearance: Normal	  HEENT:   Normal oral mucosa, PERRL, EOMI	  Lymphatic: No lymphadenopathy  Cardiovascular: Normal S1 S2, No JVD, = murmurs, No edema  Respiratory: Lungs clear to auscultation	  Psychiatry: A & O x 3, Mood & affect appropriate  Gastrointestinal:  Soft, Non-tender, + BS	  Skin: No rashes, No ecchymoses, No cyanosis	  Neurologic: Non-focal  Extremities: Normal range of motion, No clubbing, cyanosis or edema  Vascular: Peripheral pulses palpable 2+ bilaterally    MEDICATIONS  (STANDING):  aspirin enteric coated 81 milliGRAM(s) Oral daily  atorvastatin 40 milliGRAM(s) Oral at bedtime  clopidogrel Tablet 75 milliGRAM(s) Oral daily  influenza   Vaccine 0.5 milliLiter(s) IntraMuscular once  lisinopril 5 milliGRAM(s) Oral daily  metoprolol tartrate 25 milliGRAM(s) Oral two times a day      TELEMETRY: 	    ECG:  	  RADIOLOGY:  OTHER: 	  	  LABS:	 	    CARDIAC MARKERS:  CARDIAC MARKERS ( 22 Sep 2019 14:58 )  x     / x     / 110 U/L / x     / 1.1 ng/mL  CARDIAC MARKERS ( 22 Sep 2019 12:27 )  x     / x     / 123 U/L / x     / 1.3 ng/mL                                14.2   7.57  )-----------( 268      ( 23 Sep 2019 08:43 )             42.5     09-23    135  |  101  |  15  ----------------------------<  106<H>  4.1   |  23  |  0.81    Ca    9.0      23 Sep 2019 06:49  Phos  3.8     09-22  Mg     2.2     09-22    TPro  7.3  /  Alb  4.1  /  TBili  0.9  /  DBili  x   /  AST  19  /  ALT  25  /  AlkPhos  101  09-23    proBNP: Serum Pro-Brain Natriuretic Peptide: 73 pg/mL (09-22 @ 12:27)    Lipid Profile:   HgA1c: Hemoglobin A1C, Whole Blood: 5.7 % (09-22 @ 17:27)    TSH:   PT/INR - ( 22 Sep 2019 12:27 )   PT: 12.9 sec;   INR: 1.12 ratio         PTT - ( 22 Sep 2019 12:27 )  PTT:29.8 sec  Troponin T, High Sensitivity (09.22.19 @ 14:58)    Troponin T, High Sensitivity Result: <6: Rapid upward or downward changes in high-sensitivity troponin levels  suggest acute myocardial injury. Renal impairment may cause sustained  troponin elevations.  Normal: <6 - 14 ng/L  Indeterminate: 15-51 ng/L  Elevated: > 51 ng/L  See http://labs/test/TROPTHS on the [facility] intranet for more  information ng/L  < from: Xray Chest 2 Views PA/Lat (09.22.19 @ 13:01) >  INTERPRETATION:  PA and lateral projection of the chest was obtained on   September 22, 2019.    Indication: Chest pain.    Impression:    The heart is normal in size. The lungs are clear. No acute fractures   could be identified. No pleural effusion. No pneumothorax.      < end of copied text >        Assessment and plan  ---------------------------  ASHD s/p stent ,with new onset of chest pain  awaiting cardiac ct to r/o PE/cad  continue current meds

## 2019-09-23 NOTE — DISCHARGE NOTE PROVIDER - CARE PROVIDER_API CALL
Esdras Ellington)  Internal Medicine  [address], [address]  [place] [address]  Phone: [phone]  Fax: [phone]  Follow Up Time:     Daina Leigh (DO)  Cardiovascular Disease  [address], [address]  [place] [address]  Phone: [phone]  Fax: [phone]  Follow Up Time:

## 2019-09-24 NOTE — H&P ADULT - NSHPSOCIALHISTORY_GEN_ALL_CORE
JEZ Mohamud
Works as , high stressful, 2nd child born 7 weeks ago,  w/ wife, drinks 2 beers a week - otherwise denied tobacco or illicit drug Hx

## 2019-09-30 PROBLEM — I10 ESSENTIAL (PRIMARY) HYPERTENSION: Chronic | Status: ACTIVE | Noted: 2019-09-22

## 2019-09-30 PROBLEM — I21.4 NON-ST ELEVATION (NSTEMI) MYOCARDIAL INFARCTION: Chronic | Status: ACTIVE | Noted: 2019-09-22

## 2019-09-30 PROBLEM — I25.10 ATHEROSCLEROTIC HEART DISEASE OF NATIVE CORONARY ARTERY WITHOUT ANGINA PECTORIS: Chronic | Status: ACTIVE | Noted: 2019-09-22

## 2019-10-03 ENCOUNTER — APPOINTMENT (OUTPATIENT)
Dept: SURGERY | Facility: CLINIC | Age: 49
End: 2019-10-03

## 2020-11-18 NOTE — H&P ADULT - RESPIRATORY
Apixaban/Eliquis increases your risk for bleeding. Notify your doctor if you experience any of the following side effects: bleeding, coughing or vomiting blood, red or black stool, unexpected pain or swelling, itching or hives, chest pain, chest tightness, trouble breathing, changes in how much or how often you urinate, red or pink urine, numbness or tingling in your feet, or unusual muscle weakness. When Apixaban/Eliquis is taken with other medicines, they can affect how it works. Taking other medications such as aspirin, blood thinners, nonsteroidal anti-inflammatories, and medications that treat depression can increase your risk of bleeding. It is very important to tell your health care provider about all of the other medicines, including over-the-counter medications, herbs, and vitamins you are taking. DO NOT start, stop, or change the dosage of any medicine, including over-the-counter medicines, vitamins, and herbal products without your doctor’s approval. Any products containing aspirin or are nonsteroidal anti-inflammatories lessen the blood’s ability to form clots and add to the effect of Apixaban/Eliquis. Never take aspirin or medicines that contain aspirin without speaking to your doctor.
detailed exam

## 2022-12-07 NOTE — ASU DISCHARGE PLAN (ADULT/PEDIATRIC). - PAIN
prescription called to pharmacy Prednisone Counseling:  I discussed with the patient the risks of prolonged use of prednisone including but not limited to weight gain, insomnia, osteoporosis, mood changes, diabetes, susceptibility to infection, glaucoma and high blood pressure.  In cases where prednisone use is prolonged, patients should be monitored with blood pressure checks, serum glucose levels and an eye exam.  Additionally, the patient may need to be placed on GI prophylaxis, PCP prophylaxis, and calcium and vitamin D supplementation and/or a bisphosphonate.  The patient verbalized understanding of the proper use and the possible adverse effects of prednisone.  All of the patient's questions and concerns were addressed.

## 2024-02-18 NOTE — ED PROVIDER NOTE - CPE EDP NEURO NORM
Pupils equal, round and reactive to light, eyes are clear b/l normal... Pupils equal, round and reactive to light, eyes are clear b/l, tracking appropriately.